# Patient Record
Sex: MALE | Race: WHITE | NOT HISPANIC OR LATINO | Employment: FULL TIME | URBAN - METROPOLITAN AREA
[De-identification: names, ages, dates, MRNs, and addresses within clinical notes are randomized per-mention and may not be internally consistent; named-entity substitution may affect disease eponyms.]

---

## 2021-01-19 ENCOUNTER — TELEPHONE (OUTPATIENT)
Dept: UROLOGY | Facility: CLINIC | Age: 56
End: 2021-01-19

## 2021-01-19 ENCOUNTER — LAB VISIT (OUTPATIENT)
Dept: LAB | Facility: HOSPITAL | Age: 56
End: 2021-01-19
Attending: UROLOGY
Payer: COMMERCIAL

## 2021-01-19 DIAGNOSIS — E29.1 HYPOGONADISM IN MALE: Primary | ICD-10-CM

## 2021-01-19 DIAGNOSIS — E29.1 HYPOGONADISM IN MALE: ICD-10-CM

## 2021-01-19 PROCEDURE — 36415 COLL VENOUS BLD VENIPUNCTURE: CPT | Mod: PO

## 2021-01-19 PROCEDURE — 84153 ASSAY OF PSA TOTAL: CPT

## 2021-01-19 PROCEDURE — 84403 ASSAY OF TOTAL TESTOSTERONE: CPT

## 2021-01-20 LAB
COMPLEXED PSA SERPL-MCNC: 0.5 NG/ML (ref 0–4)
TESTOST SERPL-MCNC: 236 NG/DL (ref 304–1227)

## 2021-01-29 ENCOUNTER — OFFICE VISIT (OUTPATIENT)
Dept: UROLOGY | Facility: CLINIC | Age: 56
End: 2021-01-29
Payer: COMMERCIAL

## 2021-01-29 ENCOUNTER — LAB VISIT (OUTPATIENT)
Dept: LAB | Facility: HOSPITAL | Age: 56
End: 2021-01-29
Attending: UROLOGY
Payer: COMMERCIAL

## 2021-01-29 VITALS — DIASTOLIC BLOOD PRESSURE: 80 MMHG | TEMPERATURE: 98 F | WEIGHT: 254.63 LBS | SYSTOLIC BLOOD PRESSURE: 156 MMHG

## 2021-01-29 DIAGNOSIS — N13.8 BPH WITH OBSTRUCTION/LOWER URINARY TRACT SYMPTOMS: ICD-10-CM

## 2021-01-29 DIAGNOSIS — R39.15 URINARY URGENCY: ICD-10-CM

## 2021-01-29 DIAGNOSIS — N40.1 BPH WITH OBSTRUCTION/LOWER URINARY TRACT SYMPTOMS: ICD-10-CM

## 2021-01-29 DIAGNOSIS — E29.1 HYPOGONADISM IN MALE: Primary | ICD-10-CM

## 2021-01-29 DIAGNOSIS — E29.1 HYPOGONADISM IN MALE: ICD-10-CM

## 2021-01-29 PROCEDURE — 36415 COLL VENOUS BLD VENIPUNCTURE: CPT | Mod: PO

## 2021-01-29 PROCEDURE — 99203 PR OFFICE/OUTPT VISIT, NEW, LEVL III, 30-44 MIN: ICD-10-PCS | Mod: S$GLB,,, | Performed by: UROLOGY

## 2021-01-29 PROCEDURE — 1126F AMNT PAIN NOTED NONE PRSNT: CPT | Mod: S$GLB,,, | Performed by: UROLOGY

## 2021-01-29 PROCEDURE — 99999 PR PBB SHADOW E&M-EST. PATIENT-LVL III: ICD-10-PCS | Mod: PBBFAC,,, | Performed by: UROLOGY

## 2021-01-29 PROCEDURE — 99203 OFFICE O/P NEW LOW 30 MIN: CPT | Mod: S$GLB,,, | Performed by: UROLOGY

## 2021-01-29 PROCEDURE — 99999 PR PBB SHADOW E&M-EST. PATIENT-LVL III: CPT | Mod: PBBFAC,,, | Performed by: UROLOGY

## 2021-01-29 PROCEDURE — 1126F PR PAIN SEVERITY QUANTIFIED, NO PAIN PRESENT: ICD-10-PCS | Mod: S$GLB,,, | Performed by: UROLOGY

## 2021-01-29 PROCEDURE — 84403 ASSAY OF TOTAL TESTOSTERONE: CPT

## 2021-01-29 RX ORDER — TADALAFIL 5 MG/1
TABLET ORAL
COMMUNITY
End: 2021-08-05 | Stop reason: SDUPTHER

## 2021-01-29 RX ORDER — TESTOSTERONE CYPIONATE 200 MG/ML
400 INJECTION, SOLUTION INTRAMUSCULAR
Qty: 4 ML | Refills: 0 | Status: SHIPPED | OUTPATIENT
Start: 2021-01-29 | End: 2021-03-02 | Stop reason: SDUPTHER

## 2021-01-29 RX ORDER — TESTOSTERONE CYPIONATE 200 MG/ML
200 INJECTION, SOLUTION INTRAMUSCULAR
Qty: 2 ML | Refills: 0 | Status: SHIPPED | OUTPATIENT
Start: 2021-01-29 | End: 2021-01-29 | Stop reason: SDUPTHER

## 2021-01-29 RX ORDER — PANTOPRAZOLE SODIUM 40 MG/1
40 TABLET, DELAYED RELEASE ORAL
COMMUNITY
Start: 2020-12-07 | End: 2022-11-04

## 2021-01-29 RX ORDER — TAMSULOSIN HYDROCHLORIDE 0.4 MG/1
1 CAPSULE ORAL NIGHTLY
COMMUNITY
Start: 2021-01-09 | End: 2021-08-05 | Stop reason: SDUPTHER

## 2021-01-29 RX ORDER — TESTOSTERONE CYPIONATE 200 MG/ML
200 INJECTION, SOLUTION INTRAMUSCULAR
Status: DISCONTINUED | OUTPATIENT
Start: 2021-01-29 | End: 2021-01-29

## 2021-01-29 RX ORDER — LISINOPRIL 10 MG/1
TABLET ORAL
COMMUNITY

## 2021-01-29 RX ORDER — CITALOPRAM 20 MG/1
20 TABLET, FILM COATED ORAL DAILY
COMMUNITY
Start: 2021-01-22

## 2021-01-30 LAB — TESTOST SERPL-MCNC: 238 NG/DL (ref 304–1227)

## 2021-03-02 ENCOUNTER — TELEPHONE (OUTPATIENT)
Dept: UROLOGY | Facility: CLINIC | Age: 56
End: 2021-03-02

## 2021-03-02 RX ORDER — TESTOSTERONE CYPIONATE 200 MG/ML
400 INJECTION, SOLUTION INTRAMUSCULAR
Qty: 4 ML | Refills: 0 | Status: SHIPPED | OUTPATIENT
Start: 2021-03-02 | End: 2021-04-07 | Stop reason: SDUPTHER

## 2021-03-05 ENCOUNTER — TELEPHONE (OUTPATIENT)
Dept: UROLOGY | Facility: CLINIC | Age: 56
End: 2021-03-05

## 2021-03-17 ENCOUNTER — DOCUMENTATION ONLY (OUTPATIENT)
Dept: UROLOGY | Facility: CLINIC | Age: 56
End: 2021-03-17

## 2021-03-23 DIAGNOSIS — E29.1 HYPOGONADISM IN MALE: Primary | ICD-10-CM

## 2021-03-24 ENCOUNTER — TELEPHONE (OUTPATIENT)
Dept: UROLOGY | Facility: CLINIC | Age: 56
End: 2021-03-24

## 2021-03-25 ENCOUNTER — TELEPHONE (OUTPATIENT)
Dept: UROLOGY | Facility: CLINIC | Age: 56
End: 2021-03-25

## 2021-04-07 ENCOUNTER — TELEPHONE (OUTPATIENT)
Dept: UROLOGY | Facility: CLINIC | Age: 56
End: 2021-04-07

## 2021-04-07 RX ORDER — TESTOSTERONE CYPIONATE 200 MG/ML
400 INJECTION, SOLUTION INTRAMUSCULAR
Qty: 4 ML | Refills: 2 | Status: SHIPPED | OUTPATIENT
Start: 2021-04-07 | End: 2021-08-05 | Stop reason: SDUPTHER

## 2021-04-26 ENCOUNTER — TELEPHONE (OUTPATIENT)
Dept: UROLOGY | Facility: CLINIC | Age: 56
End: 2021-04-26

## 2021-07-09 ENCOUNTER — TELEPHONE (OUTPATIENT)
Dept: UROLOGY | Facility: CLINIC | Age: 56
End: 2021-07-09

## 2021-07-20 DIAGNOSIS — E29.1 HYPOGONADISM IN MALE: Primary | ICD-10-CM

## 2021-07-26 ENCOUNTER — LAB VISIT (OUTPATIENT)
Dept: LAB | Facility: HOSPITAL | Age: 56
End: 2021-07-26
Attending: UROLOGY
Payer: COMMERCIAL

## 2021-07-26 DIAGNOSIS — E29.1 HYPOGONADISM IN MALE: ICD-10-CM

## 2021-07-26 LAB
COMPLEXED PSA SERPL-MCNC: 0.67 NG/ML (ref 0–4)
TESTOST SERPL-MCNC: 1338 NG/DL (ref 304–1227)

## 2021-07-26 PROCEDURE — 84403 ASSAY OF TOTAL TESTOSTERONE: CPT | Performed by: UROLOGY

## 2021-07-26 PROCEDURE — 36415 COLL VENOUS BLD VENIPUNCTURE: CPT | Mod: PO | Performed by: UROLOGY

## 2021-07-26 PROCEDURE — 84153 ASSAY OF PSA TOTAL: CPT | Performed by: UROLOGY

## 2021-08-05 ENCOUNTER — OFFICE VISIT (OUTPATIENT)
Dept: UROLOGY | Facility: CLINIC | Age: 56
End: 2021-08-05
Payer: COMMERCIAL

## 2021-08-05 VITALS
DIASTOLIC BLOOD PRESSURE: 86 MMHG | WEIGHT: 254 LBS | SYSTOLIC BLOOD PRESSURE: 138 MMHG | HEIGHT: 67 IN | BODY MASS INDEX: 39.87 KG/M2

## 2021-08-05 DIAGNOSIS — G47.33 OSA (OBSTRUCTIVE SLEEP APNEA): ICD-10-CM

## 2021-08-05 DIAGNOSIS — E29.1 HYPOGONADISM IN MALE: Primary | ICD-10-CM

## 2021-08-05 DIAGNOSIS — N40.1 BPH WITH OBSTRUCTION/LOWER URINARY TRACT SYMPTOMS: ICD-10-CM

## 2021-08-05 DIAGNOSIS — N13.8 BPH WITH OBSTRUCTION/LOWER URINARY TRACT SYMPTOMS: ICD-10-CM

## 2021-08-05 DIAGNOSIS — N39.41 URGE INCONTINENCE: ICD-10-CM

## 2021-08-05 PROCEDURE — 3079F PR MOST RECENT DIASTOLIC BLOOD PRESSURE 80-89 MM HG: ICD-10-PCS | Mod: CPTII,S$GLB,, | Performed by: UROLOGY

## 2021-08-05 PROCEDURE — 99999 PR PBB SHADOW E&M-EST. PATIENT-LVL III: CPT | Mod: PBBFAC,,, | Performed by: UROLOGY

## 2021-08-05 PROCEDURE — 3079F DIAST BP 80-89 MM HG: CPT | Mod: CPTII,S$GLB,, | Performed by: UROLOGY

## 2021-08-05 PROCEDURE — 1159F MED LIST DOCD IN RCRD: CPT | Mod: CPTII,S$GLB,, | Performed by: UROLOGY

## 2021-08-05 PROCEDURE — 99214 OFFICE O/P EST MOD 30 MIN: CPT | Mod: S$GLB,,, | Performed by: UROLOGY

## 2021-08-05 PROCEDURE — 3075F SYST BP GE 130 - 139MM HG: CPT | Mod: CPTII,S$GLB,, | Performed by: UROLOGY

## 2021-08-05 PROCEDURE — 99214 PR OFFICE/OUTPT VISIT, EST, LEVL IV, 30-39 MIN: ICD-10-PCS | Mod: S$GLB,,, | Performed by: UROLOGY

## 2021-08-05 PROCEDURE — 1160F PR REVIEW ALL MEDS BY PRESCRIBER/CLIN PHARMACIST DOCUMENTED: ICD-10-PCS | Mod: CPTII,S$GLB,, | Performed by: UROLOGY

## 2021-08-05 PROCEDURE — 3075F PR MOST RECENT SYSTOLIC BLOOD PRESS GE 130-139MM HG: ICD-10-PCS | Mod: CPTII,S$GLB,, | Performed by: UROLOGY

## 2021-08-05 PROCEDURE — 1160F RVW MEDS BY RX/DR IN RCRD: CPT | Mod: CPTII,S$GLB,, | Performed by: UROLOGY

## 2021-08-05 PROCEDURE — 99999 PR PBB SHADOW E&M-EST. PATIENT-LVL III: ICD-10-PCS | Mod: PBBFAC,,, | Performed by: UROLOGY

## 2021-08-05 PROCEDURE — 1126F PR PAIN SEVERITY QUANTIFIED, NO PAIN PRESENT: ICD-10-PCS | Mod: CPTII,S$GLB,, | Performed by: UROLOGY

## 2021-08-05 PROCEDURE — 3008F PR BODY MASS INDEX (BMI) DOCUMENTED: ICD-10-PCS | Mod: CPTII,S$GLB,, | Performed by: UROLOGY

## 2021-08-05 PROCEDURE — 1126F AMNT PAIN NOTED NONE PRSNT: CPT | Mod: CPTII,S$GLB,, | Performed by: UROLOGY

## 2021-08-05 PROCEDURE — 1159F PR MEDICATION LIST DOCUMENTED IN MEDICAL RECORD: ICD-10-PCS | Mod: CPTII,S$GLB,, | Performed by: UROLOGY

## 2021-08-05 PROCEDURE — 3008F BODY MASS INDEX DOCD: CPT | Mod: CPTII,S$GLB,, | Performed by: UROLOGY

## 2021-08-05 RX ORDER — TADALAFIL 5 MG/1
5 TABLET ORAL DAILY
Qty: 30 TABLET | Refills: 11 | Status: SHIPPED | OUTPATIENT
Start: 2021-08-05 | End: 2022-05-03 | Stop reason: SDUPTHER

## 2021-08-05 RX ORDER — TROSPIUM CHLORIDE 20 MG/1
20 TABLET, FILM COATED ORAL 2 TIMES DAILY
Qty: 60 TABLET | Refills: 11 | Status: SHIPPED | OUTPATIENT
Start: 2021-08-05 | End: 2022-05-03 | Stop reason: SDUPTHER

## 2021-08-05 RX ORDER — TAMSULOSIN HYDROCHLORIDE 0.4 MG/1
1 CAPSULE ORAL NIGHTLY
Qty: 30 CAPSULE | Refills: 11 | Status: SHIPPED | OUTPATIENT
Start: 2021-08-05 | End: 2022-05-03 | Stop reason: SDUPTHER

## 2021-08-05 RX ORDER — TESTOSTERONE CYPIONATE 200 MG/ML
200 INJECTION, SOLUTION INTRAMUSCULAR
Qty: 4 ML | Refills: 5 | Status: SHIPPED | OUTPATIENT
Start: 2021-08-05 | End: 2022-02-02 | Stop reason: SDUPTHER

## 2021-09-16 ENCOUNTER — TELEPHONE (OUTPATIENT)
Dept: UROLOGY | Facility: CLINIC | Age: 56
End: 2021-09-16

## 2021-11-05 ENCOUNTER — OFFICE VISIT (OUTPATIENT)
Dept: UROLOGY | Facility: CLINIC | Age: 56
End: 2021-11-05
Payer: COMMERCIAL

## 2021-11-05 VITALS — WEIGHT: 257.25 LBS | BODY MASS INDEX: 40.3 KG/M2

## 2021-11-05 DIAGNOSIS — N32.81 OAB (OVERACTIVE BLADDER): ICD-10-CM

## 2021-11-05 DIAGNOSIS — G47.33 OSA (OBSTRUCTIVE SLEEP APNEA): ICD-10-CM

## 2021-11-05 DIAGNOSIS — E29.1 HYPOGONADISM IN MALE: Primary | ICD-10-CM

## 2021-11-05 PROCEDURE — 99214 OFFICE O/P EST MOD 30 MIN: CPT | Mod: S$GLB,,, | Performed by: UROLOGY

## 2021-11-05 PROCEDURE — 1159F MED LIST DOCD IN RCRD: CPT | Mod: CPTII,S$GLB,, | Performed by: UROLOGY

## 2021-11-05 PROCEDURE — 99214 PR OFFICE/OUTPT VISIT, EST, LEVL IV, 30-39 MIN: ICD-10-PCS | Mod: S$GLB,,, | Performed by: UROLOGY

## 2021-11-05 PROCEDURE — 99999 PR PBB SHADOW E&M-EST. PATIENT-LVL III: ICD-10-PCS | Mod: PBBFAC,,, | Performed by: UROLOGY

## 2021-11-05 PROCEDURE — 4010F PR ACE/ARB THEARPY RXD/TAKEN: ICD-10-PCS | Mod: CPTII,S$GLB,, | Performed by: UROLOGY

## 2021-11-05 PROCEDURE — 4010F ACE/ARB THERAPY RXD/TAKEN: CPT | Mod: CPTII,S$GLB,, | Performed by: UROLOGY

## 2021-11-05 PROCEDURE — 3008F BODY MASS INDEX DOCD: CPT | Mod: CPTII,S$GLB,, | Performed by: UROLOGY

## 2021-11-05 PROCEDURE — 99999 PR PBB SHADOW E&M-EST. PATIENT-LVL III: CPT | Mod: PBBFAC,,, | Performed by: UROLOGY

## 2021-11-05 PROCEDURE — 1159F PR MEDICATION LIST DOCUMENTED IN MEDICAL RECORD: ICD-10-PCS | Mod: CPTII,S$GLB,, | Performed by: UROLOGY

## 2021-11-05 PROCEDURE — 3008F PR BODY MASS INDEX (BMI) DOCUMENTED: ICD-10-PCS | Mod: CPTII,S$GLB,, | Performed by: UROLOGY

## 2021-11-05 RX ORDER — DIAZEPAM 5 MG/1
TABLET ORAL
Qty: 2 TABLET | Refills: 0 | Status: SHIPPED | OUTPATIENT
Start: 2021-11-05 | End: 2023-05-05

## 2021-11-09 ENCOUNTER — LAB VISIT (OUTPATIENT)
Dept: LAB | Facility: HOSPITAL | Age: 56
End: 2021-11-09
Attending: UROLOGY
Payer: COMMERCIAL

## 2021-11-09 DIAGNOSIS — E29.1 HYPOGONADISM IN MALE: ICD-10-CM

## 2021-11-09 LAB — TESTOST SERPL-MCNC: 108 NG/DL (ref 304–1227)

## 2021-11-09 PROCEDURE — 36415 COLL VENOUS BLD VENIPUNCTURE: CPT | Mod: PO | Performed by: UROLOGY

## 2021-11-09 PROCEDURE — 84403 ASSAY OF TOTAL TESTOSTERONE: CPT | Performed by: UROLOGY

## 2021-11-18 ENCOUNTER — TELEPHONE (OUTPATIENT)
Dept: UROLOGY | Facility: CLINIC | Age: 56
End: 2021-11-18
Payer: COMMERCIAL

## 2021-11-19 ENCOUNTER — PROCEDURE VISIT (OUTPATIENT)
Dept: UROLOGY | Facility: CLINIC | Age: 56
End: 2021-11-19
Payer: COMMERCIAL

## 2021-11-19 VITALS
SYSTOLIC BLOOD PRESSURE: 120 MMHG | HEIGHT: 67 IN | BODY MASS INDEX: 39.93 KG/M2 | WEIGHT: 254.44 LBS | DIASTOLIC BLOOD PRESSURE: 90 MMHG

## 2021-11-19 DIAGNOSIS — R39.15 URINARY URGENCY: Primary | ICD-10-CM

## 2021-11-19 DIAGNOSIS — N40.1 BPH WITH OBSTRUCTION/LOWER URINARY TRACT SYMPTOMS: ICD-10-CM

## 2021-11-19 DIAGNOSIS — N13.8 BPH WITH OBSTRUCTION/LOWER URINARY TRACT SYMPTOMS: ICD-10-CM

## 2021-11-19 LAB
BILIRUB SERPL-MCNC: NORMAL MG/DL
BLOOD URINE, POC: NORMAL
CLARITY, POC UA: CLEAR
COLOR, POC UA: YELLOW
GLUCOSE UR QL STRIP: 30
KETONES UR QL STRIP: NORMAL
LEUKOCYTE ESTERASE URINE, POC: NORMAL
NITRITE, POC UA: NORMAL
PH, POC UA: 7
PROTEIN, POC: NORMAL
SPECIFIC GRAVITY, POC UA: 1.01
UROBILINOGEN, POC UA: NORMAL

## 2021-11-19 PROCEDURE — 52000 CYSTOURETHROSCOPY: CPT | Mod: S$GLB,,, | Performed by: UROLOGY

## 2021-11-19 PROCEDURE — 81002 URINALYSIS NONAUTO W/O SCOPE: CPT | Mod: S$GLB,,, | Performed by: UROLOGY

## 2021-11-19 PROCEDURE — 52000 PR CYSTOURETHROSCOPY: ICD-10-PCS | Mod: S$GLB,,, | Performed by: UROLOGY

## 2021-11-19 PROCEDURE — 81002 POCT URINE DIPSTICK WITHOUT MICROSCOPE: ICD-10-PCS | Mod: S$GLB,,, | Performed by: UROLOGY

## 2021-11-19 RX ORDER — CIPROFLOXACIN 500 MG/1
500 TABLET ORAL
Status: COMPLETED | OUTPATIENT
Start: 2021-11-19 | End: 2021-11-19

## 2021-11-19 RX ORDER — LIDOCAINE HYDROCHLORIDE 20 MG/ML
JELLY TOPICAL
Status: COMPLETED | OUTPATIENT
Start: 2021-11-19 | End: 2021-11-19

## 2021-11-19 RX ORDER — LIDOCAINE HYDROCHLORIDE 20 MG/ML
JELLY TOPICAL
Status: DISCONTINUED | OUTPATIENT
Start: 2021-11-19 | End: 2021-11-19

## 2021-11-19 RX ADMIN — LIDOCAINE HYDROCHLORIDE 10 ML: 20 JELLY TOPICAL at 03:11

## 2021-11-19 RX ADMIN — CIPROFLOXACIN 500 MG: 500 TABLET ORAL at 03:11

## 2021-11-22 ENCOUNTER — HOSPITAL ENCOUNTER (OUTPATIENT)
Dept: RADIOLOGY | Facility: HOSPITAL | Age: 56
Discharge: HOME | End: 2021-11-22
Attending: UROLOGY
Payer: COMMERCIAL

## 2021-11-22 DIAGNOSIS — N13.8 BPH WITH OBSTRUCTION/LOWER URINARY TRACT SYMPTOMS: ICD-10-CM

## 2021-11-22 DIAGNOSIS — N40.1 BPH WITH OBSTRUCTION/LOWER URINARY TRACT SYMPTOMS: ICD-10-CM

## 2021-11-22 PROCEDURE — 76857 US EXAM PELVIC LIMITED: CPT | Mod: 26,,, | Performed by: RADIOLOGY

## 2021-11-22 PROCEDURE — 76857 US EXAM PELVIC LIMITED: CPT | Mod: TC

## 2021-11-22 PROCEDURE — 76857 US PELVIS LIMITED NON OB: ICD-10-PCS | Mod: 26,,, | Performed by: RADIOLOGY

## 2021-11-23 ENCOUNTER — PROCEDURE VISIT (OUTPATIENT)
Dept: UROLOGY | Facility: CLINIC | Age: 56
End: 2021-11-23
Payer: COMMERCIAL

## 2021-11-23 DIAGNOSIS — E29.1 HYPOGONADISM IN MALE: Primary | ICD-10-CM

## 2021-11-23 DIAGNOSIS — N13.8 BPH WITH OBSTRUCTION/LOWER URINARY TRACT SYMPTOMS: ICD-10-CM

## 2021-11-23 DIAGNOSIS — N40.1 BPH WITH OBSTRUCTION/LOWER URINARY TRACT SYMPTOMS: ICD-10-CM

## 2021-11-23 PROCEDURE — 99213 PR OFFICE/OUTPT VISIT, EST, LEVL III, 20-29 MIN: ICD-10-PCS | Mod: 25,S$GLB,, | Performed by: UROLOGY

## 2021-11-23 PROCEDURE — S0189 PR TESTOSTERONE PELLET 75 MG: ICD-10-PCS | Mod: S$GLB,,, | Performed by: UROLOGY

## 2021-11-23 PROCEDURE — S0189 TESTOSTERONE PELLET 75 MG: HCPCS | Mod: S$GLB,,, | Performed by: UROLOGY

## 2021-11-23 PROCEDURE — 11980 PR IMPLANT,HORMONE,SUBCUTANEOUS: ICD-10-PCS | Mod: S$GLB,,, | Performed by: UROLOGY

## 2021-11-23 PROCEDURE — 99213 OFFICE O/P EST LOW 20 MIN: CPT | Mod: 25,S$GLB,, | Performed by: UROLOGY

## 2021-11-23 PROCEDURE — 11980 IMPLANT HORMONE PELLET(S): CPT | Mod: S$GLB,,, | Performed by: UROLOGY

## 2021-11-24 DIAGNOSIS — Z01.818 PREOP TESTING: Primary | ICD-10-CM

## 2021-11-29 ENCOUNTER — HOSPITAL ENCOUNTER (OUTPATIENT)
Dept: RADIOLOGY | Facility: HOSPITAL | Age: 56
Discharge: HOME | End: 2021-11-29
Attending: UROLOGY
Payer: COMMERCIAL

## 2021-11-29 ENCOUNTER — HOSPITAL ENCOUNTER (OUTPATIENT)
Dept: CARDIOLOGY | Facility: HOSPITAL | Age: 56
Discharge: HOME | End: 2021-11-29
Attending: UROLOGY
Payer: COMMERCIAL

## 2021-11-29 DIAGNOSIS — Z01.818 PREOP TESTING: ICD-10-CM

## 2021-11-29 PROCEDURE — 93010 ELECTROCARDIOGRAM REPORT: CPT | Mod: ,,, | Performed by: INTERNAL MEDICINE

## 2021-11-29 PROCEDURE — 71046 X-RAY EXAM CHEST 2 VIEWS: CPT | Mod: TC,FY,PO

## 2021-11-29 PROCEDURE — 93005 ELECTROCARDIOGRAM TRACING: CPT | Mod: PO

## 2021-11-29 PROCEDURE — 93010 EKG 12-LEAD: ICD-10-PCS | Mod: ,,, | Performed by: INTERNAL MEDICINE

## 2021-11-29 PROCEDURE — 71046 XR CHEST PA AND LATERAL: ICD-10-PCS | Mod: 26,,, | Performed by: RADIOLOGY

## 2021-11-29 PROCEDURE — 71046 X-RAY EXAM CHEST 2 VIEWS: CPT | Mod: 26,,, | Performed by: RADIOLOGY

## 2021-12-15 ENCOUNTER — LAB VISIT (OUTPATIENT)
Dept: LAB | Facility: HOSPITAL | Age: 56
End: 2021-12-15
Attending: UROLOGY
Payer: COMMERCIAL

## 2021-12-15 DIAGNOSIS — E29.1 HYPOGONADISM IN MALE: ICD-10-CM

## 2021-12-15 LAB — TESTOST SERPL-MCNC: 321 NG/DL (ref 304–1227)

## 2021-12-15 PROCEDURE — 84403 ASSAY OF TOTAL TESTOSTERONE: CPT | Performed by: UROLOGY

## 2021-12-15 PROCEDURE — 36415 COLL VENOUS BLD VENIPUNCTURE: CPT | Mod: PO | Performed by: UROLOGY

## 2021-12-16 ENCOUNTER — TELEPHONE (OUTPATIENT)
Dept: UROLOGY | Facility: CLINIC | Age: 56
End: 2021-12-16
Payer: COMMERCIAL

## 2021-12-16 DIAGNOSIS — E29.1 HYPOGONADISM IN MALE: Primary | ICD-10-CM

## 2022-01-24 ENCOUNTER — LAB VISIT (OUTPATIENT)
Dept: LAB | Facility: HOSPITAL | Age: 57
End: 2022-01-24
Attending: UROLOGY
Payer: COMMERCIAL

## 2022-01-24 DIAGNOSIS — E29.1 HYPOGONADISM IN MALE: ICD-10-CM

## 2022-01-24 PROCEDURE — 36415 COLL VENOUS BLD VENIPUNCTURE: CPT | Mod: PO | Performed by: UROLOGY

## 2022-01-24 PROCEDURE — 84403 ASSAY OF TOTAL TESTOSTERONE: CPT | Performed by: UROLOGY

## 2022-01-25 LAB — TESTOST SERPL-MCNC: 283 NG/DL (ref 304–1227)

## 2022-01-27 ENCOUNTER — TELEPHONE (OUTPATIENT)
Dept: UROLOGY | Facility: CLINIC | Age: 57
End: 2022-01-27
Payer: COMMERCIAL

## 2022-01-27 DIAGNOSIS — E29.1 HYPOGONADISM IN MALE: Primary | ICD-10-CM

## 2022-01-27 RX ORDER — TESTOSTERONE ENANTHATE 75 MG/.5ML
75 INJECTION SUBCUTANEOUS
Qty: 4 EACH | Refills: 3 | Status: SHIPPED | OUTPATIENT
Start: 2022-01-27 | End: 2022-05-03 | Stop reason: SDUPTHER

## 2022-01-27 NOTE — TELEPHONE ENCOUNTER
Called pt and discussed low testosterone level 2 months following testopel. Discussed alternative therapies and he would like to try xyosted. Will arrange f/u and labs in 3 months.

## 2022-02-01 ENCOUNTER — TELEPHONE (OUTPATIENT)
Dept: UROLOGY | Facility: CLINIC | Age: 57
End: 2022-02-01
Payer: COMMERCIAL

## 2022-02-01 NOTE — TELEPHONE ENCOUNTER
Called pt to verify prescription provider, submitted pt prior auth, once completed informed pt that it may take 48-72 hours for approval, pt states if not approved by tomorrow, is it possible Dr Drew can send him out a script for the testosterone cypionate until the xyosted gets approved. Informed pt that I will check the status again tomorrow and see what Dr Drew would like to do. Pt verbalized understanding. [facility]#[id number]

## 2022-02-02 RX ORDER — TESTOSTERONE CYPIONATE 200 MG/ML
200 INJECTION, SOLUTION INTRAMUSCULAR
Qty: 4 ML | Refills: 0 | Status: SHIPPED | OUTPATIENT
Start: 2022-02-02 | End: 2022-08-31 | Stop reason: SDUPTHER

## 2022-02-02 NOTE — TELEPHONE ENCOUNTER
Called and spoke with pt, informed him that Dr Drew has sent out script of testosterone cypionate to his pharmacy for him to take until we get his approval for his xyosted. Pt informed once he starts to take the Xyosted his to no longer take the Testosteron Cypionate injection, Pt verbalized understanding.

## 2022-03-03 ENCOUNTER — CLINICAL SUPPORT (OUTPATIENT)
Dept: URGENT CARE | Facility: CLINIC | Age: 57
End: 2022-03-03
Payer: COMMERCIAL

## 2022-03-03 DIAGNOSIS — Z11.52 ENCOUNTER FOR SCREENING FOR COVID-19: Primary | ICD-10-CM

## 2022-03-03 LAB
CTP QC/QA: YES
SARS-COV-2 RDRP RESP QL NAA+PROBE: NEGATIVE

## 2022-03-03 PROCEDURE — 99211 PR OFFICE/OUTPT VISIT, EST, LEVL I: ICD-10-PCS | Mod: S$GLB,,, | Performed by: STUDENT IN AN ORGANIZED HEALTH CARE EDUCATION/TRAINING PROGRAM

## 2022-03-03 PROCEDURE — 99211 OFF/OP EST MAY X REQ PHY/QHP: CPT | Mod: S$GLB,,, | Performed by: STUDENT IN AN ORGANIZED HEALTH CARE EDUCATION/TRAINING PROGRAM

## 2022-03-03 PROCEDURE — U0002: ICD-10-PCS | Mod: QW,S$GLB,, | Performed by: STUDENT IN AN ORGANIZED HEALTH CARE EDUCATION/TRAINING PROGRAM

## 2022-03-03 PROCEDURE — U0002 COVID-19 LAB TEST NON-CDC: HCPCS | Mod: QW,S$GLB,, | Performed by: STUDENT IN AN ORGANIZED HEALTH CARE EDUCATION/TRAINING PROGRAM

## 2022-03-03 NOTE — PATIENT INSTRUCTIONS
Your test was NEGATIVE for COVID-19 (coronavirus).      You may leave home and/or return to work when the following conditions are met:  24 hours fever free without fever-reducing medications AND  Improved symptoms  You are fully vaccinated or have not had close contact with someone with COVID-19 (within 6 feet for 15 minutes or more)    If you are fully vaccinated and had a close contact, there is no need for quarantine, unless you develop symptoms.      If you are not fully vaccinated and had a close contact:  Retest at 5 to 7 days post-exposure  If possible, it is recommended that you quarantine for 5 days from the time of contact regardless of your test status.  A mask should be worn indoors post quarantine.    If your symptoms worsen or if you have any other concerns, please contact [facility] at [phone].     Sincerely,    Joann Dumont MA

## 2022-04-26 ENCOUNTER — LAB VISIT (OUTPATIENT)
Dept: LAB | Facility: HOSPITAL | Age: 57
End: 2022-04-26
Attending: UROLOGY
Payer: COMMERCIAL

## 2022-04-26 DIAGNOSIS — E29.1 HYPOGONADISM IN MALE: ICD-10-CM

## 2022-04-26 PROCEDURE — 84153 ASSAY OF PSA TOTAL: CPT | Performed by: UROLOGY

## 2022-04-26 PROCEDURE — 80053 COMPREHEN METABOLIC PANEL: CPT | Performed by: UROLOGY

## 2022-04-26 PROCEDURE — 36415 COLL VENOUS BLD VENIPUNCTURE: CPT | Mod: PO | Performed by: UROLOGY

## 2022-04-26 PROCEDURE — 85027 COMPLETE CBC AUTOMATED: CPT | Performed by: UROLOGY

## 2022-04-26 PROCEDURE — 84403 ASSAY OF TOTAL TESTOSTERONE: CPT | Performed by: UROLOGY

## 2022-04-27 LAB
ALBUMIN SERPL BCP-MCNC: 3.8 G/DL (ref 3.5–5.2)
ALP SERPL-CCNC: 43 U/L (ref 55–135)
ALT SERPL W/O P-5'-P-CCNC: 18 U/L (ref 10–44)
ANION GAP SERPL CALC-SCNC: 10 MMOL/L (ref 8–16)
AST SERPL-CCNC: 16 U/L (ref 10–40)
BILIRUB SERPL-MCNC: 0.3 MG/DL (ref 0.1–1)
BUN SERPL-MCNC: 13 MG/DL (ref 6–20)
CALCIUM SERPL-MCNC: 9.5 MG/DL (ref 8.7–10.5)
CHLORIDE SERPL-SCNC: 99 MMOL/L (ref 95–110)
CO2 SERPL-SCNC: 29 MMOL/L (ref 23–29)
COMPLEXED PSA SERPL-MCNC: 0.72 NG/ML (ref 0–4)
CREAT SERPL-MCNC: 0.9 MG/DL (ref 0.5–1.4)
ERYTHROCYTE [DISTWIDTH] IN BLOOD BY AUTOMATED COUNT: 20.6 % (ref 11.5–14.5)
EST. GFR  (AFRICAN AMERICAN): >60 ML/MIN/1.73 M^2
EST. GFR  (NON AFRICAN AMERICAN): >60 ML/MIN/1.73 M^2
GLUCOSE SERPL-MCNC: 82 MG/DL (ref 70–110)
HCT VFR BLD AUTO: 49.2 % (ref 40–54)
HGB BLD-MCNC: 14.4 G/DL (ref 14–18)
MCH RBC QN AUTO: 22.5 PG (ref 27–31)
MCHC RBC AUTO-ENTMCNC: 29.3 G/DL (ref 32–36)
MCV RBC AUTO: 77 FL (ref 82–98)
PLATELET # BLD AUTO: 169 K/UL (ref 150–450)
PMV BLD AUTO: 10.8 FL (ref 9.2–12.9)
POTASSIUM SERPL-SCNC: 4.4 MMOL/L (ref 3.5–5.1)
PROT SERPL-MCNC: 7.3 G/DL (ref 6–8.4)
RBC # BLD AUTO: 6.4 M/UL (ref 4.6–6.2)
SODIUM SERPL-SCNC: 138 MMOL/L (ref 136–145)
TESTOST SERPL-MCNC: 609 NG/DL (ref 304–1227)
WBC # BLD AUTO: 7.84 K/UL (ref 3.9–12.7)

## 2022-05-03 ENCOUNTER — OFFICE VISIT (OUTPATIENT)
Dept: UROLOGY | Facility: CLINIC | Age: 57
End: 2022-05-03
Payer: COMMERCIAL

## 2022-05-03 VITALS
DIASTOLIC BLOOD PRESSURE: 80 MMHG | BODY MASS INDEX: 39.57 KG/M2 | WEIGHT: 252.63 LBS | SYSTOLIC BLOOD PRESSURE: 122 MMHG

## 2022-05-03 DIAGNOSIS — N40.1 BPH WITH OBSTRUCTION/LOWER URINARY TRACT SYMPTOMS: ICD-10-CM

## 2022-05-03 DIAGNOSIS — N32.81 OAB (OVERACTIVE BLADDER): ICD-10-CM

## 2022-05-03 DIAGNOSIS — E29.1 HYPOGONADISM IN MALE: Primary | ICD-10-CM

## 2022-05-03 DIAGNOSIS — N13.8 BPH WITH OBSTRUCTION/LOWER URINARY TRACT SYMPTOMS: ICD-10-CM

## 2022-05-03 PROCEDURE — 3008F BODY MASS INDEX DOCD: CPT | Mod: CPTII,S$GLB,, | Performed by: UROLOGY

## 2022-05-03 PROCEDURE — 3079F DIAST BP 80-89 MM HG: CPT | Mod: CPTII,S$GLB,, | Performed by: UROLOGY

## 2022-05-03 PROCEDURE — 3008F PR BODY MASS INDEX (BMI) DOCUMENTED: ICD-10-PCS | Mod: CPTII,S$GLB,, | Performed by: UROLOGY

## 2022-05-03 PROCEDURE — 4010F ACE/ARB THERAPY RXD/TAKEN: CPT | Mod: CPTII,S$GLB,, | Performed by: UROLOGY

## 2022-05-03 PROCEDURE — 4010F PR ACE/ARB THEARPY RXD/TAKEN: ICD-10-PCS | Mod: CPTII,S$GLB,, | Performed by: UROLOGY

## 2022-05-03 PROCEDURE — 99999 PR PBB SHADOW E&M-EST. PATIENT-LVL III: ICD-10-PCS | Mod: PBBFAC,,, | Performed by: UROLOGY

## 2022-05-03 PROCEDURE — 99214 OFFICE O/P EST MOD 30 MIN: CPT | Mod: S$GLB,,, | Performed by: UROLOGY

## 2022-05-03 PROCEDURE — 3079F PR MOST RECENT DIASTOLIC BLOOD PRESSURE 80-89 MM HG: ICD-10-PCS | Mod: CPTII,S$GLB,, | Performed by: UROLOGY

## 2022-05-03 PROCEDURE — 3074F PR MOST RECENT SYSTOLIC BLOOD PRESSURE < 130 MM HG: ICD-10-PCS | Mod: CPTII,S$GLB,, | Performed by: UROLOGY

## 2022-05-03 PROCEDURE — 1159F PR MEDICATION LIST DOCUMENTED IN MEDICAL RECORD: ICD-10-PCS | Mod: CPTII,S$GLB,, | Performed by: UROLOGY

## 2022-05-03 PROCEDURE — 99999 PR PBB SHADOW E&M-EST. PATIENT-LVL III: CPT | Mod: PBBFAC,,, | Performed by: UROLOGY

## 2022-05-03 PROCEDURE — 1159F MED LIST DOCD IN RCRD: CPT | Mod: CPTII,S$GLB,, | Performed by: UROLOGY

## 2022-05-03 PROCEDURE — 3074F SYST BP LT 130 MM HG: CPT | Mod: CPTII,S$GLB,, | Performed by: UROLOGY

## 2022-05-03 PROCEDURE — 99214 PR OFFICE/OUTPT VISIT, EST, LEVL IV, 30-39 MIN: ICD-10-PCS | Mod: S$GLB,,, | Performed by: UROLOGY

## 2022-05-03 RX ORDER — TESTOSTERONE ENANTHATE 75 MG/.5ML
75 INJECTION SUBCUTANEOUS
Qty: 4 EACH | Refills: 5 | Status: SHIPPED | OUTPATIENT
Start: 2022-05-03 | End: 2022-08-31

## 2022-05-03 RX ORDER — TADALAFIL 5 MG/1
5 TABLET ORAL DAILY
Qty: 30 TABLET | Refills: 11 | Status: SHIPPED | OUTPATIENT
Start: 2022-05-03 | End: 2023-05-05 | Stop reason: SDUPTHER

## 2022-05-03 RX ORDER — TROSPIUM CHLORIDE 20 MG/1
20 TABLET, FILM COATED ORAL DAILY
Qty: 30 TABLET | Refills: 11 | Status: SHIPPED | OUTPATIENT
Start: 2022-05-03 | End: 2023-05-05

## 2022-05-03 RX ORDER — TAMSULOSIN HYDROCHLORIDE 0.4 MG/1
1 CAPSULE ORAL NIGHTLY
Qty: 30 CAPSULE | Refills: 11 | Status: SHIPPED | OUTPATIENT
Start: 2022-05-03 | End: 2023-05-05 | Stop reason: SDUPTHER

## 2022-05-03 NOTE — PROGRESS NOTES
CC: low T, BPH    History of Present Illness:     5/3/22- Using xyosted 75mg Q7 days. Took injection 1 day prior to blood work. Can feel his level drop off toward the end. Taking trospium 20mg once daily and seems to be working well. No difficulty emptying. UUI is much improved. EtOH makes UUI worse. Continues to take flomax and cialis.  Erections are okay.   11/23/21- here for testopel. Prostate measures at 44g on ultrasound  11/19/21- here for cysto.   11/5/21-CMP and CBC done at [facility] and fairly normal. On flomax, daily cialis, sanctura and T 400mg IM Q 14 days. It's been 3 weeks since his last injection. Reports that he is gaining fat and not sure why. Eating healthy. Urgency has improved but notices his pants are a little wet. Wearing C-pap.   8/5/21- Pt is a 55yo male here for follow up.  He has testosterone deficiency and has been managed on testopel for years now, but recently switched back to IM testosterone cypionate injections due to insurance. He is also managed on flomax and daily cialis. Wears his C-pap. He reports that he is having insensate incontinence. Also has urgency, which is severe. Also having nocturnal enuresis. Good stream. Feels like he empties.   1 cup of coffee daily. Tried oxybutynin in the past, but can't remember why he stopped.     H/H a little elevated at 17.3/54.7. Has since donated blood.     Review of Systems   Constitutional: Negative for activity change, appetite change, chills and fever.   Respiratory: Negative for chest tightness and shortness of breath.    Cardiovascular: Negative for chest pain.   Gastrointestinal: Negative for abdominal pain.   Genitourinary: Positive for nocturia. Negative for enuresis.   Psychiatric/Behavioral: Negative for confusion.   All other systems reviewed and are negative.      Current Outpatient Medications   Medication Sig Dispense Refill    citalopram (CELEXA) 20 MG tablet Take 20 mg by mouth once daily.      diazePAM (VALIUM) 5 MG tablet  Take 1 po 45 minutes before visit. 2 tablet 0    lisinopriL 10 MG tablet lisinopril 10 mg tabs      testosterone cypionate (DEPOTESTOTERONE CYPIONATE) 200 mg/mL injection Inject 1 mL (200 mg total) into the muscle every 7 days. 4 mL 0    pantoprazole (PROTONIX) 40 MG tablet Take 40 mg by mouth.      tadalafiL (CIALIS) 5 MG tablet Take 1 tablet (5 mg total) by mouth once daily. 30 tablet 11    tamsulosin (FLOMAX) 0.4 mg Cap Take 1 capsule (0.4 mg total) by mouth every evening. 30 capsule 11    testosterone enanthate (XYOSTED) 75 mg/0.5 mL AtIn Inject 75 mg into the skin every 7 days. 4 each 5    trospium (SANCTURA) 20 mg Tab tablet Take 1 tablet (20 mg total) by mouth once daily. 30 tablet 11     No current facility-administered medications for this visit.       Review of patient's allergies indicates:  No Known Allergies    Past medical, family, and social history reviewed as documented in chart with pertinent positive medical, family, and social history detailed in HPI.    Physical Exam  Vitals:    05/03/22 0922   BP: 122/80       General: Well-developed, well-nourished, in no acute distress  HEENT: Normocephalic, atraumatic, extraocular eye movements in tact  Neck: supple, trachea midline, no cervical or supraclavicular adenopathy  Respirations: Even and unlabored  Abdomen: soft, Non-tender, Non-distended, no palpable masses, no rebound or guarding  Rectal: 8/5/21-30gram prostate without nodules or tenderness. No gross blood.   Extremities: Moves all extremities equally, atraumatic, no clubbing, cyanosis, edema  Skin: warm and dry, no lesions  Psych: normal affect  Neuro: Alert and oriented x 3. Cranial nerves II-XII grossly intact    PVR: 39cc 8/5/21    Urinalysis  Negative for blood, LE, nitrite      PSA  4/26/22: 0.72    Testosterone, Total   Date/Time Value Ref Range Status   04/26/2022 09:49  304 - 1227 ng/dL Final         Assessment:   1. Hypogonadism in male  POCT URINE DIPSTICK WITHOUT  MICROSCOPE    Testosterone    CBC Without Differential    Comprehensive Metabolic Panel    Testosterone   2. OAB (overactive bladder)     3. BPH with obstruction/lower urinary tract symptoms  Prostate Specific Antigen, Diagnostic         Plan:  Hypogonadism in male  -     POCT URINE DIPSTICK WITHOUT MICROSCOPE  -     Testosterone; Future; Expected date: 05/03/2022  -     CBC Without Differential; Future; Expected date: 11/17/2022  -     Comprehensive Metabolic Panel; Future; Expected date: 11/17/2022  -     Testosterone; Future; Expected date: 11/17/2022    OAB (overactive bladder)    BPH with obstruction/lower urinary tract symptoms  -     Prostate Specific Antigen, Diagnostic; Future; Expected date: 11/17/2022    Other orders  -     trospium (SANCTURA) 20 mg Tab tablet; Take 1 tablet (20 mg total) by mouth once daily.  Dispense: 30 tablet; Refill: 11  -     tamsulosin (FLOMAX) 0.4 mg Cap; Take 1 capsule (0.4 mg total) by mouth every evening.  Dispense: 30 capsule; Refill: 11  -     tadalafiL (CIALIS) 5 MG tablet; Take 1 tablet (5 mg total) by mouth once daily.  Dispense: 30 tablet; Refill: 11  -     testosterone enanthate (XYOSTED) 75 mg/0.5 mL AtIn; Inject 75 mg into the skin every 7 days.  Dispense: 4 each; Refill: 5       Check Friday testosterone level in a few weeks to assess trough and may need to increase dose if low.       Follow up in about 6 months (around 11/3/2022) for labs before visit.

## 2022-06-17 ENCOUNTER — LAB VISIT (OUTPATIENT)
Dept: LAB | Facility: HOSPITAL | Age: 57
End: 2022-06-17
Attending: UROLOGY
Payer: COMMERCIAL

## 2022-06-17 DIAGNOSIS — E29.1 HYPOGONADISM IN MALE: ICD-10-CM

## 2022-06-17 LAB — TESTOST SERPL-MCNC: 306 NG/DL (ref 304–1227)

## 2022-06-17 PROCEDURE — 84403 ASSAY OF TOTAL TESTOSTERONE: CPT | Performed by: UROLOGY

## 2022-06-17 PROCEDURE — 36415 COLL VENOUS BLD VENIPUNCTURE: CPT | Mod: PO | Performed by: UROLOGY

## 2022-08-10 ENCOUNTER — TELEPHONE (OUTPATIENT)
Dept: UROLOGY | Facility: CLINIC | Age: 57
End: 2022-08-10
Payer: COMMERCIAL

## 2022-08-11 NOTE — TELEPHONE ENCOUNTER
Attempted to contact Raj. Left in hold for 10 mins. Not able to get in  Contact with raj. Advised yaneth that pt scheduled to do labs in Nov

## 2022-08-11 NOTE — TELEPHONE ENCOUNTER
----- Message from Chantell Plummer sent at 8/10/2022  9:25 AM CDT -----  Contact: Immanuel calling from [facility]@[phone]  Immanuel calling to let the nurse know that the blood orders are  and she will be faxing over a form to be filled out. Please call to advise.

## 2022-08-12 ENCOUNTER — TELEPHONE (OUTPATIENT)
Dept: UROLOGY | Facility: CLINIC | Age: 57
End: 2022-08-12
Payer: COMMERCIAL

## 2022-08-12 NOTE — TELEPHONE ENCOUNTER
Orders sent to [facility] [phone]. Spoke to pt regarding this and he has been aware.       ----- Message from Linda Sanchez sent at 8/12/2022  9:08 AM CDT -----  Contact: Nuha/nurse at [facility]  Nuha would like to have some testosterone orders faxed over to [facility] donor center. Please give a call to the patient when it is sent over so he can schedule his appointment [phone].    Fax number: [phone]    Thanks

## 2022-08-15 ENCOUNTER — TELEPHONE (OUTPATIENT)
Dept: UROLOGY | Facility: CLINIC | Age: 57
End: 2022-08-15
Payer: COMMERCIAL

## 2022-08-15 NOTE — TELEPHONE ENCOUNTER
----- Message from Laura Crane LPN sent at 8/15/2022 11:41 AM CDT -----  Contact: [facility] / Audrey [phone]    ----- Message -----  From: Susana Hatch  Sent: 8/15/2022  11:14 AM CDT  To: Rajendra OLIVERA Staff    The patient is there and need an order for Phlebotomy.    Thank you

## 2022-08-15 NOTE — TELEPHONE ENCOUNTER
Called spoke with Maury at [facility] waiting on orders for Testosterone draw. Informed Maury wrong form sent will refax new order.

## 2022-08-16 ENCOUNTER — TELEPHONE (OUTPATIENT)
Dept: UROLOGY | Facility: CLINIC | Age: 57
End: 2022-08-16
Payer: COMMERCIAL

## 2022-08-16 NOTE — TELEPHONE ENCOUNTER
----- Message from Jocy Maurice sent at 8/16/2022  3:33 PM CDT -----  Contact: Audrey/ [facility]  Audrey stated the orders that were sent were last years orders so she is needing updated and the most recent orders. Please fax it to [phone]. Please call her back at [phone]

## 2022-08-16 NOTE — TELEPHONE ENCOUNTER
Called and spoke with pt, informed him that his order has been sent, I did apologize for the delay. Pt verbalized understanding.

## 2022-08-17 ENCOUNTER — TELEPHONE (OUTPATIENT)
Dept: UROLOGY | Facility: CLINIC | Age: 57
End: 2022-08-17
Payer: COMMERCIAL

## 2022-08-17 NOTE — TELEPHONE ENCOUNTER
----- Message from Viki Kendall sent at 8/17/2022  1:19 PM CDT -----  Regarding: orders  Pt calling in regards to order , its  the same order from last year with last yrs date .       A form was faxed over that detailed what was needed .      Confirmed patient's contact info below:  Contact Name: Audrey   Phone Number: [phone]

## 2022-08-17 NOTE — TELEPHONE ENCOUNTER
Called and spoke with Audrey at [facility], informed her that I refaxed the correct order form yesterday after 5pm. Informed her that I will resend it if she would like, she stated that would be great. Refaxed form to [phone].

## 2022-08-31 ENCOUNTER — TELEPHONE (OUTPATIENT)
Dept: UROLOGY | Facility: CLINIC | Age: 57
End: 2022-08-31
Payer: COMMERCIAL

## 2022-08-31 RX ORDER — TESTOSTERONE CYPIONATE 200 MG/ML
200 INJECTION, SOLUTION INTRAMUSCULAR
Qty: 4 ML | Refills: 2 | Status: SHIPPED | OUTPATIENT
Start: 2022-08-31 | End: 2022-11-04 | Stop reason: SDUPTHER

## 2022-08-31 NOTE — TELEPHONE ENCOUNTER
Contacted pt regarding testosterone. Pt states that pharmacy is refusing  refills for his testosterone xyate and will like to get back in the previous prescription. Pt states he's been out for a week and will like for me to contact pharmacy.   After contacting pharmacy. Pharmacy states they did not refused. Pt was advised that his insurance was no accepting the coupon as secondary to lower the payment and there for pharmacy  had to use the good rx coupon as primary which increase the price out of packet of the testosterone. Pt didn't want to pay the price. Pt now requesting previous RX

## 2022-08-31 NOTE — TELEPHONE ENCOUNTER
----- Message from Damaris Allen sent at 8/29/2022  1:17 PM CDT -----  States he left a message last week regarding his injection. States the pharmacy is denying his injection. States he needs his injection or can he go back to his old injection, they will pay for that one. States he would need a new prescription. He would like the nurse to call him back. Please call pt [phone]. Thank you

## 2022-09-01 ENCOUNTER — TELEPHONE (OUTPATIENT)
Dept: UROLOGY | Facility: CLINIC | Age: 57
End: 2022-09-01
Payer: COMMERCIAL

## 2022-09-01 NOTE — TELEPHONE ENCOUNTER
Called and spoke with pt, informed pt that new script was sent to [facility], however it required a prior auth, prior auth was submitted yesterday to [employer] to get approval. Informed pt that once I hear something from them I will let him know. Pt states he has a hard time getting in touch with us. Informed pt the quickest way is thru mychart. Pt states he can not reset it and requested I deactivate it and reactivate it so he can reset it up. Per pt request did just that.

## 2022-09-01 NOTE — TELEPHONE ENCOUNTER
----- Message from Denisha Sandoval sent at 9/1/2022 10:11 AM CDT -----  Contact: Tre  Patient is calling to speak with someone regarding prescription. Patient request refill of older testosterone injections to be sent to pharmacy below. Patient request to be informed if needing to schedule a visit. Please give patient a call back at [phone] as soon as possible.    [facility], [facility] [address] [address]  [address] [address] [address]  Phone: [phone] Fax: [phone]    Thank you,  GH

## 2022-09-07 ENCOUNTER — TELEPHONE (OUTPATIENT)
Dept: UROLOGY | Facility: CLINIC | Age: 57
End: 2022-09-07
Payer: COMMERCIAL

## 2022-09-07 RX ORDER — TESTOSTERONE CYPIONATE 200 MG/ML
200 INJECTION, SOLUTION INTRAMUSCULAR
Qty: 4 ML | Refills: 2 | Status: CANCELLED | OUTPATIENT
Start: 2022-09-07

## 2022-09-07 NOTE — TELEPHONE ENCOUNTER
Called and spoke with pt, pt states pharmacy saying medication isnt going through, pt wife on other line with [facility], informed pt to have pharmacy rerun his prescription again because we have an approval letter showing it approved. Pharmacy told pt they will rerun it and let them know if it goes through.

## 2022-09-07 NOTE — TELEPHONE ENCOUNTER
----- Message from Karina Ybarra sent at 9/7/2022  8:39 AM CDT -----  Contact: self/[phone]  Patient is calling regarding his injection shot, he would like for Dr Drew to call him back at [phone]. He states he is 4 weeks within it. Please call him back. Thanks/ar

## 2022-11-01 ENCOUNTER — LAB VISIT (OUTPATIENT)
Dept: LAB | Facility: HOSPITAL | Age: 57
End: 2022-11-01
Attending: UROLOGY
Payer: COMMERCIAL

## 2022-11-01 DIAGNOSIS — N40.1 BPH WITH OBSTRUCTION/LOWER URINARY TRACT SYMPTOMS: ICD-10-CM

## 2022-11-01 DIAGNOSIS — E29.1 HYPOGONADISM IN MALE: ICD-10-CM

## 2022-11-01 DIAGNOSIS — N13.8 BPH WITH OBSTRUCTION/LOWER URINARY TRACT SYMPTOMS: ICD-10-CM

## 2022-11-01 LAB
ALBUMIN SERPL BCP-MCNC: 3.8 G/DL (ref 3.5–5.2)
ALP SERPL-CCNC: 42 U/L (ref 55–135)
ALT SERPL W/O P-5'-P-CCNC: 21 U/L (ref 10–44)
ANION GAP SERPL CALC-SCNC: 6 MMOL/L (ref 8–16)
AST SERPL-CCNC: 16 U/L (ref 10–40)
BILIRUB SERPL-MCNC: 0.4 MG/DL (ref 0.1–1)
BUN SERPL-MCNC: 10 MG/DL (ref 6–20)
CALCIUM SERPL-MCNC: 9.6 MG/DL (ref 8.7–10.5)
CHLORIDE SERPL-SCNC: 101 MMOL/L (ref 95–110)
CO2 SERPL-SCNC: 30 MMOL/L (ref 23–29)
COMPLEXED PSA SERPL-MCNC: 0.58 NG/ML (ref 0–4)
CREAT SERPL-MCNC: 1 MG/DL (ref 0.5–1.4)
ERYTHROCYTE [DISTWIDTH] IN BLOOD BY AUTOMATED COUNT: 20.3 % (ref 11.5–14.5)
EST. GFR  (NO RACE VARIABLE): >60 ML/MIN/1.73 M^2
GLUCOSE SERPL-MCNC: 112 MG/DL (ref 70–110)
HCT VFR BLD AUTO: 51.5 % (ref 40–54)
HGB BLD-MCNC: 14.7 G/DL (ref 14–18)
MCH RBC QN AUTO: 21.7 PG (ref 27–31)
MCHC RBC AUTO-ENTMCNC: 28.5 G/DL (ref 32–36)
MCV RBC AUTO: 76 FL (ref 82–98)
PLATELET # BLD AUTO: 184 K/UL (ref 150–450)
PMV BLD AUTO: 11.2 FL (ref 9.2–12.9)
POTASSIUM SERPL-SCNC: 4.4 MMOL/L (ref 3.5–5.1)
PROT SERPL-MCNC: 7.3 G/DL (ref 6–8.4)
RBC # BLD AUTO: 6.78 M/UL (ref 4.6–6.2)
SODIUM SERPL-SCNC: 137 MMOL/L (ref 136–145)
TESTOST SERPL-MCNC: 732 NG/DL (ref 304–1227)
WBC # BLD AUTO: 7.86 K/UL (ref 3.9–12.7)

## 2022-11-01 PROCEDURE — 36415 COLL VENOUS BLD VENIPUNCTURE: CPT | Mod: PO | Performed by: UROLOGY

## 2022-11-01 PROCEDURE — 84153 ASSAY OF PSA TOTAL: CPT | Performed by: UROLOGY

## 2022-11-01 PROCEDURE — 84403 ASSAY OF TOTAL TESTOSTERONE: CPT | Performed by: UROLOGY

## 2022-11-01 PROCEDURE — 85027 COMPLETE CBC AUTOMATED: CPT | Performed by: UROLOGY

## 2022-11-01 PROCEDURE — 80053 COMPREHEN METABOLIC PANEL: CPT | Performed by: UROLOGY

## 2022-11-04 ENCOUNTER — OFFICE VISIT (OUTPATIENT)
Dept: UROLOGY | Facility: CLINIC | Age: 57
End: 2022-11-04
Payer: COMMERCIAL

## 2022-11-04 ENCOUNTER — LAB VISIT (OUTPATIENT)
Dept: LAB | Facility: HOSPITAL | Age: 57
End: 2022-11-04
Attending: UROLOGY
Payer: COMMERCIAL

## 2022-11-04 VITALS — WEIGHT: 258.63 LBS | SYSTOLIC BLOOD PRESSURE: 130 MMHG | BODY MASS INDEX: 40.5 KG/M2 | DIASTOLIC BLOOD PRESSURE: 80 MMHG

## 2022-11-04 DIAGNOSIS — E34.9 TESTOSTERONE DEFICIENCY: Primary | ICD-10-CM

## 2022-11-04 DIAGNOSIS — E66.01 MORBID OBESITY: ICD-10-CM

## 2022-11-04 DIAGNOSIS — N40.1 BPH WITH OBSTRUCTION/LOWER URINARY TRACT SYMPTOMS: ICD-10-CM

## 2022-11-04 DIAGNOSIS — N13.8 BPH WITH OBSTRUCTION/LOWER URINARY TRACT SYMPTOMS: ICD-10-CM

## 2022-11-04 DIAGNOSIS — N39.41 URGE INCONTINENCE: ICD-10-CM

## 2022-11-04 PROCEDURE — 1160F RVW MEDS BY RX/DR IN RCRD: CPT | Mod: CPTII,S$GLB,, | Performed by: UROLOGY

## 2022-11-04 PROCEDURE — 99999 PR PBB SHADOW E&M-EST. PATIENT-LVL II: ICD-10-PCS | Mod: PBBFAC,,, | Performed by: UROLOGY

## 2022-11-04 PROCEDURE — 3075F SYST BP GE 130 - 139MM HG: CPT | Mod: CPTII,S$GLB,, | Performed by: UROLOGY

## 2022-11-04 PROCEDURE — 3075F PR MOST RECENT SYSTOLIC BLOOD PRESS GE 130-139MM HG: ICD-10-PCS | Mod: CPTII,S$GLB,, | Performed by: UROLOGY

## 2022-11-04 PROCEDURE — 3079F PR MOST RECENT DIASTOLIC BLOOD PRESSURE 80-89 MM HG: ICD-10-PCS | Mod: CPTII,S$GLB,, | Performed by: UROLOGY

## 2022-11-04 PROCEDURE — 84443 ASSAY THYROID STIM HORMONE: CPT | Performed by: UROLOGY

## 2022-11-04 PROCEDURE — 3008F PR BODY MASS INDEX (BMI) DOCUMENTED: ICD-10-PCS | Mod: CPTII,S$GLB,, | Performed by: UROLOGY

## 2022-11-04 PROCEDURE — 1160F PR REVIEW ALL MEDS BY PRESCRIBER/CLIN PHARMACIST DOCUMENTED: ICD-10-PCS | Mod: CPTII,S$GLB,, | Performed by: UROLOGY

## 2022-11-04 PROCEDURE — 84480 ASSAY TRIIODOTHYRONINE (T3): CPT | Performed by: UROLOGY

## 2022-11-04 PROCEDURE — 4010F PR ACE/ARB THEARPY RXD/TAKEN: ICD-10-PCS | Mod: CPTII,S$GLB,, | Performed by: UROLOGY

## 2022-11-04 PROCEDURE — 4010F ACE/ARB THERAPY RXD/TAKEN: CPT | Mod: CPTII,S$GLB,, | Performed by: UROLOGY

## 2022-11-04 PROCEDURE — 3079F DIAST BP 80-89 MM HG: CPT | Mod: CPTII,S$GLB,, | Performed by: UROLOGY

## 2022-11-04 PROCEDURE — 1159F MED LIST DOCD IN RCRD: CPT | Mod: CPTII,S$GLB,, | Performed by: UROLOGY

## 2022-11-04 PROCEDURE — 1159F PR MEDICATION LIST DOCUMENTED IN MEDICAL RECORD: ICD-10-PCS | Mod: CPTII,S$GLB,, | Performed by: UROLOGY

## 2022-11-04 PROCEDURE — 99214 PR OFFICE/OUTPT VISIT, EST, LEVL IV, 30-39 MIN: ICD-10-PCS | Mod: S$GLB,,, | Performed by: UROLOGY

## 2022-11-04 PROCEDURE — 36415 COLL VENOUS BLD VENIPUNCTURE: CPT | Mod: PN | Performed by: UROLOGY

## 2022-11-04 PROCEDURE — 3008F BODY MASS INDEX DOCD: CPT | Mod: CPTII,S$GLB,, | Performed by: UROLOGY

## 2022-11-04 PROCEDURE — 99999 PR PBB SHADOW E&M-EST. PATIENT-LVL II: CPT | Mod: PBBFAC,,, | Performed by: UROLOGY

## 2022-11-04 PROCEDURE — 99214 OFFICE O/P EST MOD 30 MIN: CPT | Mod: S$GLB,,, | Performed by: UROLOGY

## 2022-11-04 PROCEDURE — 84436 ASSAY OF TOTAL THYROXINE: CPT | Performed by: UROLOGY

## 2022-11-04 RX ORDER — TESTOSTERONE CYPIONATE 200 MG/ML
200 INJECTION, SOLUTION INTRAMUSCULAR
Qty: 4 ML | Refills: 5 | Status: SHIPPED | OUTPATIENT
Start: 2022-11-04 | End: 2023-11-07 | Stop reason: SDUPTHER

## 2022-11-04 NOTE — PROGRESS NOTES
CC: low T, BPH    History of Present Illness:     11/4/22- Recent testosterone level was good at 732 1 day after injection. Feels like he is having pretty good energy. Patient has been on testosterone cypionate 200mg IM Q7 days. Also on daily cialis and flomax. He continues to have UUI and changes clothing when it happens. States that stream is great.     5/3/22- Using xyosted 75mg Q7 days. Took injection 1 day prior to blood work. Can feel his level drop off toward the end. Taking trospium 20mg once daily and seems to be working well. No difficulty emptying. UUI is much improved. EtOH makes UUI worse. Continues to take flomax and cialis.  Erections are okay.   11/23/21- here for testopel. Prostate measures at 44g on ultrasound  11/19/21- here for cysto.   11/5/21-CMP and CBC done at [facility] and fairly normal. On flomax, daily cialis, sanctura and T 400mg IM Q 14 days. It's been 3 weeks since his last injection. Reports that he is gaining fat and not sure why. Eating healthy. Urgency has improved but notices his pants are a little wet. Wearing C-pap.   8/5/21- Pt is a 55yo male here for follow up.  He has testosterone deficiency and has been managed on testopel for years now, but recently switched back to IM testosterone cypionate injections due to insurance. He is also managed on flomax and daily cialis. Wears his C-pap. He reports that he is having insensate incontinence. Also has urgency, which is severe. Also having nocturnal enuresis. Good stream. Feels like he empties.   1 cup of coffee daily. Tried oxybutynin in the past, but can't remember why he stopped.     H/H a little elevated at 17.3/54.7. Has since donated blood.     Review of Systems   Constitutional:  Negative for activity change, appetite change, chills and fever.   Respiratory:  Negative for chest tightness and shortness of breath.    Cardiovascular:  Negative for chest pain.   Gastrointestinal:  Negative for abdominal pain.   Genitourinary:   Positive for nocturia. Negative for enuresis.   Psychiatric/Behavioral:  Negative for confusion.    All other systems reviewed and are negative.    Current Outpatient Medications   Medication Sig Dispense Refill    lisinopriL 10 MG tablet lisinopril 10 mg tabs      pantoprazole (PROTONIX) 40 MG tablet Take 40 mg by mouth.      tadalafiL (CIALIS) 5 MG tablet Take 1 tablet (5 mg total) by mouth once daily. 30 tablet 11    tamsulosin (FLOMAX) 0.4 mg Cap Take 1 capsule (0.4 mg total) by mouth every evening. 30 capsule 11    citalopram (CELEXA) 20 MG tablet Take 20 mg by mouth once daily.      diazePAM (VALIUM) 5 MG tablet Take 1 po 45 minutes before visit. (Patient not taking: Reported on 11/4/2022) 2 tablet 0    mirabegron (MYRBETRIQ) 50 mg Tb24 Take 1 tablet (50 mg total) by mouth once daily. 30 tablet 11    testosterone cypionate (DEPOTESTOTERONE CYPIONATE) 200 mg/mL injection Inject 1 mL (200 mg total) into the muscle every 7 days. 4 mL 5    trospium (SANCTURA) 20 mg Tab tablet Take 1 tablet (20 mg total) by mouth once daily. (Patient not taking: Reported on 11/4/2022) 30 tablet 11     No current facility-administered medications for this visit.       Review of patient's allergies indicates:  No Known Allergies    Past medical, family, and social history reviewed as documented in chart with pertinent positive medical, family, and social history detailed in HPI.    Physical Exam  Vitals:    11/04/22 1006   BP: 130/80       General: Well-developed, well-nourished, in no acute distress  HEENT: Normocephalic, atraumatic, extraocular eye movements in tact  Neck: supple, trachea midline, no cervical or supraclavicular adenopathy  Respirations: Even and unlabored  Abdomen: soft, Non-tender, Non-distended, no palpable masses, no rebound or guarding  Rectal: 11/4/22- 40gram prostate without nodules or tenderness. No gross blood.   Extremities: Moves all extremities equally, atraumatic, no clubbing, cyanosis, edema  Skin:  warm and dry, no lesions  Psych: normal affect  Neuro: Alert and oriented x 3. Cranial nerves II-XII grossly intact    PVR: 39cc 8/5/21        PSA  4/26/22: 0.72  11/1/22: 0.58    Testosterone, Total   Date/Time Value Ref Range Status   11/01/2022 09:14  304 - 1227 ng/dL Final         Assessment:   1. Testosterone deficiency  CBC Without Differential    Comprehensive Metabolic Panel    Testosterone      2. BPH with obstruction/lower urinary tract symptoms  Prostate Specific Antigen, Diagnostic      3. Morbid obesity  TSH    T4    T3    Lipid Panel      4. Urge incontinence                Plan:  Testosterone deficiency  -     CBC Without Differential; Future; Expected date: 05/04/2023  -     Comprehensive Metabolic Panel; Future; Expected date: 05/04/2023  -     Testosterone; Future; Expected date: 05/04/2023    BPH with obstruction/lower urinary tract symptoms  -     Prostate Specific Antigen, Diagnostic; Future; Expected date: 05/04/2023    Morbid obesity  -     TSH; Future; Expected date: 11/04/2022  -     T4; Future; Expected date: 11/04/2022  -     T3; Future; Expected date: 11/04/2022  -     Lipid Panel; Future; Expected date: 05/04/2023    Urge incontinence    Other orders  -     mirabegron (MYRBETRIQ) 50 mg Tb24; Take 1 tablet (50 mg total) by mouth once daily.  Dispense: 30 tablet; Refill: 11  -     testosterone cypionate (DEPOTESTOTERONE CYPIONATE) 200 mg/mL injection; Inject 1 mL (200 mg total) into the muscle every 7 days.  Dispense: 4 mL; Refill: 5         Continue flomax and cialis  States that when UroLift was previously scheduled, it was going to cost too much out of pocket, but he may be interested in pursuing surgery after the new year, UroLift vs TURP. He will contact us if he wants to schedule. Otherwise, will try myrbetriq for now. Pt has failed trospium and oxybutynin.   Follow up in about 6 months (around 5/4/2023) for labs before visit.

## 2022-11-05 LAB
T3 SERPL-MCNC: 97 NG/DL (ref 60–180)
T4 SERPL-MCNC: 3.8 UG/DL (ref 4.5–11.5)
TSH SERPL DL<=0.005 MIU/L-ACNC: 2.65 UIU/ML (ref 0.4–4)

## 2023-05-01 ENCOUNTER — LAB VISIT (OUTPATIENT)
Dept: LAB | Facility: HOSPITAL | Age: 58
End: 2023-05-01
Attending: UROLOGY
Payer: COMMERCIAL

## 2023-05-01 DIAGNOSIS — E34.9 TESTOSTERONE DEFICIENCY: ICD-10-CM

## 2023-05-01 DIAGNOSIS — N40.1 BPH WITH OBSTRUCTION/LOWER URINARY TRACT SYMPTOMS: ICD-10-CM

## 2023-05-01 DIAGNOSIS — N13.8 BPH WITH OBSTRUCTION/LOWER URINARY TRACT SYMPTOMS: ICD-10-CM

## 2023-05-01 DIAGNOSIS — E66.01 MORBID OBESITY: ICD-10-CM

## 2023-05-01 LAB
ALBUMIN SERPL BCP-MCNC: 3.8 G/DL (ref 3.5–5.2)
ALP SERPL-CCNC: 43 U/L (ref 55–135)
ALT SERPL W/O P-5'-P-CCNC: 28 U/L (ref 10–44)
ANION GAP SERPL CALC-SCNC: 6 MMOL/L (ref 8–16)
AST SERPL-CCNC: 23 U/L (ref 10–40)
BILIRUB SERPL-MCNC: 0.3 MG/DL (ref 0.1–1)
BUN SERPL-MCNC: 15 MG/DL (ref 6–20)
CALCIUM SERPL-MCNC: 9.4 MG/DL (ref 8.7–10.5)
CHLORIDE SERPL-SCNC: 105 MMOL/L (ref 95–110)
CHOLEST SERPL-MCNC: 212 MG/DL (ref 120–199)
CHOLEST/HDLC SERPL: 7.1 {RATIO} (ref 2–5)
CO2 SERPL-SCNC: 28 MMOL/L (ref 23–29)
COMPLEXED PSA SERPL-MCNC: 0.39 NG/ML (ref 0–4)
CREAT SERPL-MCNC: 0.9 MG/DL (ref 0.5–1.4)
ERYTHROCYTE [DISTWIDTH] IN BLOOD BY AUTOMATED COUNT: 20.9 % (ref 11.5–14.5)
EST. GFR  (NO RACE VARIABLE): >60 ML/MIN/1.73 M^2
GLUCOSE SERPL-MCNC: 129 MG/DL (ref 70–110)
HCT VFR BLD AUTO: 51 % (ref 40–54)
HDLC SERPL-MCNC: 30 MG/DL (ref 40–75)
HDLC SERPL: 14.2 % (ref 20–50)
HGB BLD-MCNC: 15.3 G/DL (ref 14–18)
LDLC SERPL CALC-MCNC: ABNORMAL MG/DL (ref 63–159)
MCH RBC QN AUTO: 21.6 PG (ref 27–31)
MCHC RBC AUTO-ENTMCNC: 30 G/DL (ref 32–36)
MCV RBC AUTO: 72 FL (ref 82–98)
NONHDLC SERPL-MCNC: 182 MG/DL
PLATELET # BLD AUTO: 170 K/UL (ref 150–450)
PMV BLD AUTO: ABNORMAL FL (ref 9.2–12.9)
POTASSIUM SERPL-SCNC: 4.2 MMOL/L (ref 3.5–5.1)
PROT SERPL-MCNC: 7.5 G/DL (ref 6–8.4)
RBC # BLD AUTO: 7.07 M/UL (ref 4.6–6.2)
SODIUM SERPL-SCNC: 139 MMOL/L (ref 136–145)
TESTOST SERPL-MCNC: 517 NG/DL (ref 304–1227)
TRIGL SERPL-MCNC: 416 MG/DL (ref 30–150)
WBC # BLD AUTO: 6.34 K/UL (ref 3.9–12.7)

## 2023-05-01 PROCEDURE — 85027 COMPLETE CBC AUTOMATED: CPT | Performed by: UROLOGY

## 2023-05-01 PROCEDURE — 80061 LIPID PANEL: CPT | Performed by: UROLOGY

## 2023-05-01 PROCEDURE — 84153 ASSAY OF PSA TOTAL: CPT | Performed by: UROLOGY

## 2023-05-01 PROCEDURE — 80053 COMPREHEN METABOLIC PANEL: CPT | Performed by: UROLOGY

## 2023-05-01 PROCEDURE — 36415 COLL VENOUS BLD VENIPUNCTURE: CPT | Mod: PN | Performed by: UROLOGY

## 2023-05-01 PROCEDURE — 84403 ASSAY OF TOTAL TESTOSTERONE: CPT | Performed by: UROLOGY

## 2023-05-05 ENCOUNTER — OFFICE VISIT (OUTPATIENT)
Dept: UROLOGY | Facility: CLINIC | Age: 58
End: 2023-05-05
Payer: COMMERCIAL

## 2023-05-05 VITALS
DIASTOLIC BLOOD PRESSURE: 77 MMHG | RESPIRATION RATE: 18 BRPM | HEART RATE: 91 BPM | WEIGHT: 270.94 LBS | HEIGHT: 67 IN | SYSTOLIC BLOOD PRESSURE: 128 MMHG | BODY MASS INDEX: 42.53 KG/M2

## 2023-05-05 DIAGNOSIS — E78.1 HYPERTRIGLYCERIDEMIA: ICD-10-CM

## 2023-05-05 DIAGNOSIS — D75.1 POLYCYTHEMIA: ICD-10-CM

## 2023-05-05 DIAGNOSIS — E34.9 TESTOSTERONE DEFICIENCY: Primary | ICD-10-CM

## 2023-05-05 DIAGNOSIS — N40.1 BPH WITH OBSTRUCTION/LOWER URINARY TRACT SYMPTOMS: ICD-10-CM

## 2023-05-05 DIAGNOSIS — N32.81 OAB (OVERACTIVE BLADDER): ICD-10-CM

## 2023-05-05 DIAGNOSIS — N13.8 BPH WITH OBSTRUCTION/LOWER URINARY TRACT SYMPTOMS: ICD-10-CM

## 2023-05-05 PROCEDURE — 3078F PR MOST RECENT DIASTOLIC BLOOD PRESSURE < 80 MM HG: ICD-10-PCS | Mod: CPTII,S$GLB,, | Performed by: UROLOGY

## 2023-05-05 PROCEDURE — 3074F SYST BP LT 130 MM HG: CPT | Mod: CPTII,S$GLB,, | Performed by: UROLOGY

## 2023-05-05 PROCEDURE — 99999 PR PBB SHADOW E&M-EST. PATIENT-LVL III: CPT | Mod: PBBFAC,,, | Performed by: UROLOGY

## 2023-05-05 PROCEDURE — 3074F PR MOST RECENT SYSTOLIC BLOOD PRESSURE < 130 MM HG: ICD-10-PCS | Mod: CPTII,S$GLB,, | Performed by: UROLOGY

## 2023-05-05 PROCEDURE — 1159F PR MEDICATION LIST DOCUMENTED IN MEDICAL RECORD: ICD-10-PCS | Mod: CPTII,S$GLB,, | Performed by: UROLOGY

## 2023-05-05 PROCEDURE — 4010F ACE/ARB THERAPY RXD/TAKEN: CPT | Mod: CPTII,S$GLB,, | Performed by: UROLOGY

## 2023-05-05 PROCEDURE — 3008F PR BODY MASS INDEX (BMI) DOCUMENTED: ICD-10-PCS | Mod: CPTII,S$GLB,, | Performed by: UROLOGY

## 2023-05-05 PROCEDURE — 1159F MED LIST DOCD IN RCRD: CPT | Mod: CPTII,S$GLB,, | Performed by: UROLOGY

## 2023-05-05 PROCEDURE — 3078F DIAST BP <80 MM HG: CPT | Mod: CPTII,S$GLB,, | Performed by: UROLOGY

## 2023-05-05 PROCEDURE — 3008F BODY MASS INDEX DOCD: CPT | Mod: CPTII,S$GLB,, | Performed by: UROLOGY

## 2023-05-05 PROCEDURE — 99999 PR PBB SHADOW E&M-EST. PATIENT-LVL III: ICD-10-PCS | Mod: PBBFAC,,, | Performed by: UROLOGY

## 2023-05-05 PROCEDURE — 99214 PR OFFICE/OUTPT VISIT, EST, LEVL IV, 30-39 MIN: ICD-10-PCS | Mod: S$GLB,,, | Performed by: UROLOGY

## 2023-05-05 PROCEDURE — 99214 OFFICE O/P EST MOD 30 MIN: CPT | Mod: S$GLB,,, | Performed by: UROLOGY

## 2023-05-05 PROCEDURE — 4010F PR ACE/ARB THEARPY RXD/TAKEN: ICD-10-PCS | Mod: CPTII,S$GLB,, | Performed by: UROLOGY

## 2023-05-05 RX ORDER — NEEDLES, DISPOSABLE 25GX5/8"
NEEDLE, DISPOSABLE MISCELLANEOUS
Qty: 12 EACH | Refills: 4 | Status: SHIPPED | OUTPATIENT
Start: 2023-05-05 | End: 2023-11-07 | Stop reason: SDUPTHER

## 2023-05-05 RX ORDER — TADALAFIL 5 MG/1
5 TABLET ORAL DAILY
Qty: 30 TABLET | Refills: 11 | Status: SHIPPED | OUTPATIENT
Start: 2023-05-05

## 2023-05-05 RX ORDER — TESTOSTERONE CYPIONATE 200 MG/ML
200 INJECTION, SOLUTION INTRAMUSCULAR
Qty: 4 ML | Refills: 5 | Status: SHIPPED | OUTPATIENT
Start: 2023-05-05 | End: 2023-11-03

## 2023-05-05 RX ORDER — TAMSULOSIN HYDROCHLORIDE 0.4 MG/1
1 CAPSULE ORAL NIGHTLY
Qty: 30 CAPSULE | Refills: 11 | Status: SHIPPED | OUTPATIENT
Start: 2023-05-05

## 2023-05-05 RX ORDER — SYRINGE, DISPOSABLE, 3 ML
SYRINGE, EMPTY DISPOSABLE MISCELLANEOUS
Qty: 12 EACH | Refills: 4 | Status: SHIPPED | OUTPATIENT
Start: 2023-05-05 | End: 2023-11-07 | Stop reason: SDUPTHER

## 2023-05-05 RX ORDER — NEEDLES, DISPOSABLE 23GX1 1/2"
NEEDLE, DISPOSABLE MISCELLANEOUS
Qty: 12 EACH | Refills: 4 | Status: SHIPPED | OUTPATIENT
Start: 2023-05-05 | End: 2023-11-07 | Stop reason: SDUPTHER

## 2023-05-05 NOTE — PROGRESS NOTES
CC: low T, BPH    History of Present Illness:     5/5/23-Myrbetriq does help, but he still has some UUI. Stream is good. Energy is not great, but sometimes missing injections and then doubling up. No gross hematuria.   11/4/22- Recent testosterone level was good at 732 1 day after injection. Feels like he is having pretty good energy. Patient has been on testosterone cypionate 200mg IM Q7 days. Also on daily cialis and flomax. He continues to have UUI and changes clothing when it happens. States that stream is great.     5/3/22- Using xyosted 75mg Q7 days. Took injection 1 day prior to blood work. Can feel his level drop off toward the end. Taking trospium 20mg once daily and seems to be working well. No difficulty emptying. UUI is much improved. EtOH makes UUI worse. Continues to take flomax and cialis.  Erections are okay.   11/23/21- here for testopel. Prostate measures at 44g on ultrasound  11/19/21- here for cysto.   11/5/21-CMP and CBC done at [facility] and fairly normal. On flomax, daily cialis, sanctura and T 400mg IM Q 14 days. It's been 3 weeks since his last injection. Reports that he is gaining fat and not sure why. Eating healthy. Urgency has improved but notices his pants are a little wet. Wearing C-pap.   8/5/21- Pt is a 57yo male here for follow up.  He has testosterone deficiency and has been managed on testopel for years now, but recently switched back to IM testosterone cypionate injections due to insurance. He is also managed on flomax and daily cialis. Wears his C-pap. He reports that he is having insensate incontinence. Also has urgency, which is severe. Also having nocturnal enuresis. Good stream. Feels like he empties.   1 cup of coffee daily. Tried oxybutynin in the past, but can't remember why he stopped.     H/H a little elevated at 17.3/54.7. Has since donated blood.     Review of Systems   Constitutional:  Negative for activity change, appetite change, chills and fever.   Respiratory:   "Negative for chest tightness and shortness of breath.    Cardiovascular:  Negative for chest pain.   Gastrointestinal:  Negative for abdominal pain.   Genitourinary:  Positive for nocturia. Negative for enuresis.   Psychiatric/Behavioral:  Negative for confusion.    All other systems reviewed and are negative.    Current Outpatient Medications   Medication Sig Dispense Refill    citalopram (CELEXA) 20 MG tablet Take 20 mg by mouth once daily.      lisinopriL 10 MG tablet lisinopril 10 mg tabs      testosterone cypionate (DEPOTESTOTERONE CYPIONATE) 200 mg/mL injection Inject 1 mL (200 mg total) into the muscle every 7 days. 4 mL 5    mirabegron (MYRBETRIQ) 50 mg Tb24 Take 1 tablet (50 mg total) by mouth once daily. 30 tablet 11    needle, disp, 18 G (HYPODERMIC NEEDLES) 18 gauge x 1 1/2" Ndle Use as directed 12 each 4    needle, disp, 23 gauge (EASY TOUCH HYPODERMIC NEEDLE) 23 gauge x 1 1/2" Ndle Use as directed 12 each 4    pantoprazole (PROTONIX) 40 MG tablet Take 40 mg by mouth.      syringe, disposable, 3 mL Syrg Use as directed 12 each 4    tadalafiL (CIALIS) 5 MG tablet Take 1 tablet (5 mg total) by mouth once daily. 30 tablet 11    tamsulosin (FLOMAX) 0.4 mg Cap Take 1 capsule (0.4 mg total) by mouth every evening. 30 capsule 11    testosterone cypionate (DEPOTESTOTERONE CYPIONATE) 200 mg/mL injection Inject 1 mL (200 mg total) into the muscle every 7 days. 4 mL 5     No current facility-administered medications for this visit.       Review of patient's allergies indicates:  No Known Allergies    Past medical, family, and social history reviewed as documented in chart with pertinent positive medical, family, and social history detailed in HPI.    Physical Exam  Vitals:    05/05/23 0916   BP: 128/77   Pulse: 91   Resp: 18       General: Well-developed, well-nourished, in no acute distress  HEENT: Normocephalic, atraumatic, extraocular eye movements in tact  Neck: supple, trachea midline, no cervical or " supraclavicular adenopathy  Respirations: Even and unlabored  Abdomen: soft, Non-tender, Non-distended, no palpable masses, no rebound or guarding  Rectal: 11/4/22- 40gram prostate without nodules or tenderness. No gross blood.   Extremities: Moves all extremities equally, atraumatic, no clubbing, cyanosis, edema  Skin: warm and dry, no lesions  Psych: normal affect  Neuro: Alert and oriented x 3. Cranial nerves II-XII grossly intact    PVR: 39cc 8/5/21      PSA  4/26/22: 0.72  11/1/22: 0.58  5/1/23: 0.39      Testosterone, Total   Date/Time Value Ref Range Status   05/01/2023 09:10  304 - 1227 ng/dL Final         Assessment:   1. Testosterone deficiency  CBC Without Differential    Comprehensive Metabolic Panel    Testosterone      2. Hypertriglyceridemia  Lipid Panel      3. BPH with obstruction/lower urinary tract symptoms  Prostate Specific Antigen, Diagnostic      4. OAB (overactive bladder)        5. Polycythemia                  Plan:  Testosterone deficiency  -     CBC Without Differential; Future; Expected date: 05/05/2023  -     Comprehensive Metabolic Panel; Future; Expected date: 05/05/2023  -     Testosterone; Future; Expected date: 05/05/2023    Hypertriglyceridemia  -     Lipid Panel; Future; Expected date: 05/05/2023    BPH with obstruction/lower urinary tract symptoms  -     Prostate Specific Antigen, Diagnostic; Future; Expected date: 05/05/2023    OAB (overactive bladder)    Polycythemia    Other orders  -     testosterone cypionate (DEPOTESTOTERONE CYPIONATE) 200 mg/mL injection; Inject 1 mL (200 mg total) into the muscle every 7 days.  Dispense: 4 mL; Refill: 5  -     tadalafiL (CIALIS) 5 MG tablet; Take 1 tablet (5 mg total) by mouth once daily.  Dispense: 30 tablet; Refill: 11  -     tamsulosin (FLOMAX) 0.4 mg Cap; Take 1 capsule (0.4 mg total) by mouth every evening.  Dispense: 30 capsule; Refill: 11  -     mirabegron (MYRBETRIQ) 50 mg Tb24; Take 1 tablet (50 mg total) by mouth once  "daily.  Dispense: 30 tablet; Refill: 11  -     needle, disp, 18 G (HYPODERMIC NEEDLES) 18 gauge x 1 1/2" Ndle; Use as directed  Dispense: 12 each; Refill: 4  -     needle, disp, 23 gauge (EASY TOUCH HYPODERMIC NEEDLE) 23 gauge x 1 1/2" Ndle; Use as directed  Dispense: 12 each; Refill: 4  -     syringe, disposable, 3 mL Syrg; Use as directed  Dispense: 12 each; Refill: 4        Continue PRN phelbotomy  Discussed high triglycerides- pt to follow up with his PCP.   Continue flomax and cialis  Still wants to think about UroLift  Follow up in about 6 months (around 11/5/2023) for labs before visit.                    "

## 2023-08-03 ENCOUNTER — TELEPHONE (OUTPATIENT)
Dept: UROLOGY | Facility: CLINIC | Age: 58
End: 2023-08-03
Payer: COMMERCIAL

## 2023-08-03 NOTE — TELEPHONE ENCOUNTER
Spoke with pharmacy gave new order to dispense 10ml of testosterone because 4ml vials weren't available.

## 2023-08-03 NOTE — TELEPHONE ENCOUNTER
----- Message from Cassidy Ceron sent at 8/3/2023  3:56 PM CDT -----  Contact: [facility]  [facility] is calling to speak with the nurse regarding medication testosterone cypionate (DEPOTESTOTERONE CYPIONATE) 4/mL injection. Explains is out of the 4 ml and switch to the 10 ml. Please give a call back at [phone] and fax # [phone] as requested.  Thanks  LR

## 2023-11-02 ENCOUNTER — LAB VISIT (OUTPATIENT)
Dept: LAB | Facility: HOSPITAL | Age: 58
End: 2023-11-02
Attending: UROLOGY
Payer: COMMERCIAL

## 2023-11-02 DIAGNOSIS — E78.1 HYPERTRIGLYCERIDEMIA: ICD-10-CM

## 2023-11-02 DIAGNOSIS — N13.8 BPH WITH OBSTRUCTION/LOWER URINARY TRACT SYMPTOMS: ICD-10-CM

## 2023-11-02 DIAGNOSIS — N40.1 BPH WITH OBSTRUCTION/LOWER URINARY TRACT SYMPTOMS: ICD-10-CM

## 2023-11-02 DIAGNOSIS — E34.9 TESTOSTERONE DEFICIENCY: ICD-10-CM

## 2023-11-02 LAB
ALBUMIN SERPL BCP-MCNC: 3.9 G/DL (ref 3.5–5.2)
ALP SERPL-CCNC: 33 U/L (ref 55–135)
ALT SERPL W/O P-5'-P-CCNC: 27 U/L (ref 10–44)
ANION GAP SERPL CALC-SCNC: 8 MMOL/L (ref 8–16)
AST SERPL-CCNC: 17 U/L (ref 10–40)
BILIRUB SERPL-MCNC: 0.3 MG/DL (ref 0.1–1)
BUN SERPL-MCNC: 13 MG/DL (ref 6–20)
CALCIUM SERPL-MCNC: 9.9 MG/DL (ref 8.7–10.5)
CHLORIDE SERPL-SCNC: 102 MMOL/L (ref 95–110)
CHOLEST SERPL-MCNC: 168 MG/DL (ref 120–199)
CHOLEST/HDLC SERPL: 5.6 {RATIO} (ref 2–5)
CO2 SERPL-SCNC: 26 MMOL/L (ref 23–29)
COMPLEXED PSA SERPL-MCNC: 0.67 NG/ML (ref 0–4)
CREAT SERPL-MCNC: 1 MG/DL (ref 0.5–1.4)
ERYTHROCYTE [DISTWIDTH] IN BLOOD BY AUTOMATED COUNT: 22.5 % (ref 11.5–14.5)
EST. GFR  (NO RACE VARIABLE): >60 ML/MIN/1.73 M^2
GLUCOSE SERPL-MCNC: 96 MG/DL (ref 70–110)
HCT VFR BLD AUTO: 52.9 % (ref 40–54)
HDLC SERPL-MCNC: 30 MG/DL (ref 40–75)
HDLC SERPL: 17.9 % (ref 20–50)
HGB BLD-MCNC: 16 G/DL (ref 14–18)
LDLC SERPL CALC-MCNC: 112 MG/DL (ref 63–159)
MCH RBC QN AUTO: 22.4 PG (ref 27–31)
MCHC RBC AUTO-ENTMCNC: 30.2 G/DL (ref 32–36)
MCV RBC AUTO: 74 FL (ref 82–98)
NONHDLC SERPL-MCNC: 138 MG/DL
PLATELET # BLD AUTO: 227 K/UL (ref 150–450)
PMV BLD AUTO: ABNORMAL FL (ref 9.2–12.9)
POTASSIUM SERPL-SCNC: 4.2 MMOL/L (ref 3.5–5.1)
PROT SERPL-MCNC: 7.5 G/DL (ref 6–8.4)
RBC # BLD AUTO: 7.14 M/UL (ref 4.6–6.2)
SODIUM SERPL-SCNC: 136 MMOL/L (ref 136–145)
TESTOST SERPL-MCNC: >1500 NG/DL (ref 304–1227)
TRIGL SERPL-MCNC: 130 MG/DL (ref 30–150)
WBC # BLD AUTO: 8.13 K/UL (ref 3.9–12.7)

## 2023-11-02 PROCEDURE — 80061 LIPID PANEL: CPT | Performed by: UROLOGY

## 2023-11-02 PROCEDURE — 85027 COMPLETE CBC AUTOMATED: CPT | Performed by: UROLOGY

## 2023-11-02 PROCEDURE — 80053 COMPREHEN METABOLIC PANEL: CPT | Performed by: UROLOGY

## 2023-11-02 PROCEDURE — 36415 COLL VENOUS BLD VENIPUNCTURE: CPT | Mod: PN | Performed by: UROLOGY

## 2023-11-02 PROCEDURE — 84153 ASSAY OF PSA TOTAL: CPT | Performed by: UROLOGY

## 2023-11-02 PROCEDURE — 84403 ASSAY OF TOTAL TESTOSTERONE: CPT | Performed by: UROLOGY

## 2023-11-07 ENCOUNTER — OFFICE VISIT (OUTPATIENT)
Dept: UROLOGY | Facility: CLINIC | Age: 58
End: 2023-11-07
Payer: COMMERCIAL

## 2023-11-07 DIAGNOSIS — N13.8 BPH WITH OBSTRUCTION/LOWER URINARY TRACT SYMPTOMS: ICD-10-CM

## 2023-11-07 DIAGNOSIS — N40.1 BPH WITH OBSTRUCTION/LOWER URINARY TRACT SYMPTOMS: ICD-10-CM

## 2023-11-07 DIAGNOSIS — N32.81 OAB (OVERACTIVE BLADDER): ICD-10-CM

## 2023-11-07 DIAGNOSIS — E34.9 TESTOSTERONE DEFICIENCY: Primary | ICD-10-CM

## 2023-11-07 LAB
BILIRUB UR QL STRIP: NEGATIVE
GLUCOSE UR QL STRIP: NEGATIVE
KETONES UR QL STRIP: NEGATIVE
LEUKOCYTE ESTERASE UR QL STRIP: NEGATIVE
PH, POC UA: 5
POC BLOOD, URINE: NEGATIVE
POC NITRATES, URINE: NEGATIVE
POC RESIDUAL URINE VOLUME: 31 ML (ref 0–100)
PROT UR QL STRIP: NEGATIVE
SP GR UR STRIP: 1.02 (ref 1–1.03)
UROBILINOGEN UR STRIP-ACNC: 0.2 (ref 0.3–2.2)

## 2023-11-07 PROCEDURE — 51798 POCT BLADDER SCAN: ICD-10-PCS | Mod: S$GLB,,, | Performed by: UROLOGY

## 2023-11-07 PROCEDURE — 51798 US URINE CAPACITY MEASURE: CPT | Mod: S$GLB,,, | Performed by: UROLOGY

## 2023-11-07 PROCEDURE — 81003 POCT URINALYSIS, DIPSTICK, AUTOMATED, W/O SCOPE: ICD-10-PCS | Mod: QW,S$GLB,, | Performed by: UROLOGY

## 2023-11-07 PROCEDURE — 99214 OFFICE O/P EST MOD 30 MIN: CPT | Mod: S$GLB,,, | Performed by: UROLOGY

## 2023-11-07 PROCEDURE — 99999 PR PBB SHADOW E&M-EST. PATIENT-LVL I: CPT | Mod: PBBFAC,,, | Performed by: UROLOGY

## 2023-11-07 PROCEDURE — 81003 URINALYSIS AUTO W/O SCOPE: CPT | Mod: QW,S$GLB,, | Performed by: UROLOGY

## 2023-11-07 PROCEDURE — 3052F HG A1C>EQUAL 8.0%<EQUAL 9.0%: CPT | Mod: CPTII,S$GLB,, | Performed by: UROLOGY

## 2023-11-07 PROCEDURE — 99999 PR PBB SHADOW E&M-EST. PATIENT-LVL I: ICD-10-PCS | Mod: PBBFAC,,, | Performed by: UROLOGY

## 2023-11-07 PROCEDURE — 99214 PR OFFICE/OUTPT VISIT, EST, LEVL IV, 30-39 MIN: ICD-10-PCS | Mod: S$GLB,,, | Performed by: UROLOGY

## 2023-11-07 PROCEDURE — 4010F ACE/ARB THERAPY RXD/TAKEN: CPT | Mod: CPTII,S$GLB,, | Performed by: UROLOGY

## 2023-11-07 PROCEDURE — 4010F PR ACE/ARB THEARPY RXD/TAKEN: ICD-10-PCS | Mod: CPTII,S$GLB,, | Performed by: UROLOGY

## 2023-11-07 PROCEDURE — 3052F PR MOST RECENT HEMOGLOBIN A1C LEVEL 8.0 - < 9.0%: ICD-10-PCS | Mod: CPTII,S$GLB,, | Performed by: UROLOGY

## 2023-11-07 RX ORDER — TESTOSTERONE CYPIONATE 200 MG/ML
200 INJECTION, SOLUTION INTRAMUSCULAR
Qty: 4 ML | Refills: 5 | Status: SHIPPED | OUTPATIENT
Start: 2023-11-07

## 2023-11-07 RX ORDER — SYRINGE, DISPOSABLE, 3 ML
SYRINGE, EMPTY DISPOSABLE MISCELLANEOUS
Qty: 12 EACH | Refills: 4 | Status: SHIPPED | OUTPATIENT
Start: 2023-11-07

## 2023-11-07 RX ORDER — FENOFIBRATE 160 MG/1
160 TABLET ORAL
COMMUNITY
Start: 2023-11-03

## 2023-11-07 RX ORDER — NEEDLES, DISPOSABLE 23GX1 1/2"
NEEDLE, DISPOSABLE MISCELLANEOUS
Qty: 12 EACH | Refills: 4 | Status: SHIPPED | OUTPATIENT
Start: 2023-11-07

## 2023-11-07 RX ORDER — NEEDLES, DISPOSABLE 25GX5/8"
NEEDLE, DISPOSABLE MISCELLANEOUS
Qty: 12 EACH | Refills: 4 | Status: SHIPPED | OUTPATIENT
Start: 2023-11-07

## 2023-11-07 RX ORDER — TIRZEPATIDE 5 MG/.5ML
INJECTION, SOLUTION SUBCUTANEOUS
COMMUNITY
Start: 2023-10-05

## 2023-11-07 RX ORDER — LEVOTHYROXINE SODIUM 25 UG/1
25 TABLET ORAL
COMMUNITY
Start: 2023-11-03

## 2023-11-07 NOTE — PROGRESS NOTES
CC: low T, BPH    History of Present Illness:     11/7/23-Pt did blood work 4 days after injection. Taking 400mg IM Q14 days. Energy is okay. Urgency is improved with myrbetriq. Taking flomax and daily cialis. No gross hematuria. No difficulty emptying. Lost weight with diet and feeling better.   5/5/23-Myrbetriq does help, but he still has some UUI. Stream is good. Energy is not great, but sometimes missing injections and then doubling up. No gross hematuria.   11/4/22- Recent testosterone level was good at 732 1 day after injection. Feels like he is having pretty good energy. Patient has been on testosterone cypionate 200mg IM Q7 days. Also on daily cialis and flomax. He continues to have UUI and changes clothing when it happens. States that stream is great.     5/3/22- Using xyosted 75mg Q7 days. Took injection 1 day prior to blood work. Can feel his level drop off toward the end. Taking trospium 20mg once daily and seems to be working well. No difficulty emptying. UUI is much improved. EtOH makes UUI worse. Continues to take flomax and cialis.  Erections are okay.   11/23/21- here for testopel. Prostate measures at 44g on ultrasound  11/19/21- here for cysto.   11/5/21-CMP and CBC done at [facility] and fairly normal. On flomax, daily cialis, sanctura and T 400mg IM Q 14 days. It's been 3 weeks since his last injection. Reports that he is gaining fat and not sure why. Eating healthy. Urgency has improved but notices his pants are a little wet. Wearing C-pap.   8/5/21- Pt is a 57yo male here for follow up.  He has testosterone deficiency and has been managed on testopel for years now, but recently switched back to IM testosterone cypionate injections due to insurance. He is also managed on flomax and daily cialis. Wears his C-pap. He reports that he is having insensate incontinence. Also has urgency, which is severe. Also having nocturnal enuresis. Good stream. Feels like he empties.   1 cup of coffee daily. Tried  "oxybutynin in the past, but can't remember why he stopped.     H/H a little elevated at 17.3/54.7. Has since donated blood.     Review of Systems   Constitutional:  Negative for activity change, appetite change, chills and fever.   Respiratory:  Negative for chest tightness and shortness of breath.    Cardiovascular:  Negative for chest pain.   Gastrointestinal:  Negative for abdominal pain.   Genitourinary:  Positive for nocturia. Negative for enuresis.   Psychiatric/Behavioral:  Negative for confusion.    All other systems reviewed and are negative.      Current Outpatient Medications   Medication Sig Dispense Refill    citalopram (CELEXA) 20 MG tablet Take 20 mg by mouth once daily.      fenofibrate 160 MG Tab Take 160 mg by mouth.      levothyroxine (SYNTHROID) 25 MCG tablet Take 25 mcg by mouth.      lisinopriL 10 MG tablet lisinopril 10 mg tabs      mirabegron (MYRBETRIQ) 50 mg Tb24 Take 1 tablet (50 mg total) by mouth once daily. 30 tablet 11    MOUNJARO 5 mg/0.5 mL PnIj INJECT 5 mg into skin EVERY 7 DAYS      needle, disp, 18 G (HYPODERMIC NEEDLES) 18 gauge x 1 1/2" Ndle Use as directed 12 each 4    needle, disp, 23 gauge (EASY TOUCH HYPODERMIC NEEDLE) 23 gauge x 1 1/2" Ndle Use as directed 12 each 4    pantoprazole (PROTONIX) 40 MG tablet Take 40 mg by mouth.      syringe, disposable, 3 mL Syrg Use as directed 12 each 4    tadalafiL (CIALIS) 5 MG tablet Take 1 tablet (5 mg total) by mouth once daily. 30 tablet 11    tamsulosin (FLOMAX) 0.4 mg Cap Take 1 capsule (0.4 mg total) by mouth every evening. 30 capsule 11    testosterone cypionate (DEPOTESTOTERONE CYPIONATE) 200 mg/mL injection Inject 1 mL (200 mg total) into the muscle every 7 days. 4 mL 5     No current facility-administered medications for this visit.       Review of patient's allergies indicates:  No Known Allergies    Past medical, family, and social history reviewed as documented in chart with pertinent positive medical, family, and social " history detailed in HPI.    Physical Exam  There were no vitals filed for this visit.      General: Well-developed, well-nourished, in no acute distress  HEENT: Normocephalic, atraumatic, extraocular eye movements in tact  Neck: supple, trachea midline, no cervical or supraclavicular adenopathy  Respirations: Even and unlabored  Abdomen: soft, Non-tender, Non-distended, no palpable masses, no rebound or guarding  Rectal: 11/7/23- 40gram prostate without nodules or tenderness. No gross blood.   Extremities: Moves all extremities equally, atraumatic, no clubbing, cyanosis, edema  Skin: warm and dry, no lesions  Psych: normal affect  Neuro: Alert and oriented x 3. Cranial nerves II-XII grossly intact    PVR: 39cc 8/5/21      PSA  4/26/22: 0.72  11/1/22: 0.58  5/1/23: 0.39  11/2/23: 0.67    Lab Results   Component Value Date    ALT 27 11/02/2023    AST 17 11/02/2023    ALKPHOS 33 (L) 11/02/2023    BILITOT 0.3 11/02/2023     BMP  Lab Results   Component Value Date     11/02/2023    K 4.2 11/02/2023     11/02/2023    CO2 26 11/02/2023    BUN 13 11/02/2023    CREATININE 1.0 11/02/2023    CALCIUM 9.9 11/02/2023    ANIONGAP 8 11/02/2023    EGFRNORACEVR >60.0 11/02/2023     Lab Results   Component Value Date    WBC 8.13 11/02/2023    HGB 16.0 11/02/2023    HCT 52.9 11/02/2023    MCV 74 (L) 11/02/2023     11/02/2023           Testosterone, Total   Date/Time Value Ref Range Status   11/02/2023 09:19 AM >1500 (H) 304 - 1227 ng/dL Final         Assessment:   1. Testosterone deficiency  Comprehensive Metabolic Panel    CBC Without Differential    Testosterone      2. BPH with obstruction/lower urinary tract symptoms  Prostate Specific Antigen, Diagnostic      3. OAB (overactive bladder)                    Plan:  Testosterone deficiency  -     Comprehensive Metabolic Panel; Future; Expected date: 05/02/2024  -     CBC Without Differential; Future; Expected date: 05/02/2024  -     Testosterone; Future; Expected  "date: 05/02/2024    BPH with obstruction/lower urinary tract symptoms  -     Prostate Specific Antigen, Diagnostic; Future; Expected date: 05/02/2024    OAB (overactive bladder)    Other orders  -     testosterone cypionate (DEPOTESTOTERONE CYPIONATE) 200 mg/mL injection; Inject 1 mL (200 mg total) into the muscle every 7 days.  Dispense: 4 mL; Refill: 5  -     needle, disp, 18 G (HYPODERMIC NEEDLES) 18 gauge x 1 1/2" Ndle; Use as directed  Dispense: 12 each; Refill: 4  -     needle, disp, 23 gauge (EASY TOUCH HYPODERMIC NEEDLE) 23 gauge x 1 1/2" Ndle; Use as directed  Dispense: 12 each; Refill: 4  -     syringe, disposable, 3 mL Syrg; Use as directed  Dispense: 12 each; Refill: 4          Continue PRN phelbotomy  Continue flomax, myrbetriq and cialis    Follow up in about 6 months (around 5/7/2024) for labs before visit.                    "

## 2024-05-06 ENCOUNTER — LAB VISIT (OUTPATIENT)
Dept: LAB | Facility: HOSPITAL | Age: 59
End: 2024-05-06
Attending: UROLOGY
Payer: COMMERCIAL

## 2024-05-06 DIAGNOSIS — N40.1 BPH WITH OBSTRUCTION/LOWER URINARY TRACT SYMPTOMS: ICD-10-CM

## 2024-05-06 DIAGNOSIS — N13.8 BPH WITH OBSTRUCTION/LOWER URINARY TRACT SYMPTOMS: ICD-10-CM

## 2024-05-06 DIAGNOSIS — E34.9 TESTOSTERONE DEFICIENCY: ICD-10-CM

## 2024-05-06 LAB
ALBUMIN SERPL BCP-MCNC: 3.9 G/DL (ref 3.5–5.2)
ALP SERPL-CCNC: 30 U/L (ref 55–135)
ALT SERPL W/O P-5'-P-CCNC: 18 U/L (ref 10–44)
ANION GAP SERPL CALC-SCNC: 8 MMOL/L (ref 8–16)
AST SERPL-CCNC: 16 U/L (ref 10–40)
BILIRUB SERPL-MCNC: 0.3 MG/DL (ref 0.1–1)
BUN SERPL-MCNC: 15 MG/DL (ref 6–20)
CALCIUM SERPL-MCNC: 9.9 MG/DL (ref 8.7–10.5)
CHLORIDE SERPL-SCNC: 105 MMOL/L (ref 95–110)
CO2 SERPL-SCNC: 24 MMOL/L (ref 23–29)
COMPLEXED PSA SERPL-MCNC: 0.73 NG/ML (ref 0–4)
CREAT SERPL-MCNC: 1 MG/DL (ref 0.5–1.4)
ERYTHROCYTE [DISTWIDTH] IN BLOOD BY AUTOMATED COUNT: 21.3 % (ref 11.5–14.5)
EST. GFR  (NO RACE VARIABLE): >60 ML/MIN/1.73 M^2
GLUCOSE SERPL-MCNC: 94 MG/DL (ref 70–110)
HCT VFR BLD AUTO: 46.3 % (ref 40–54)
HGB BLD-MCNC: 13.9 G/DL (ref 14–18)
MCH RBC QN AUTO: 21.2 PG (ref 27–31)
MCHC RBC AUTO-ENTMCNC: 30 G/DL (ref 32–36)
MCV RBC AUTO: 71 FL (ref 82–98)
PLATELET # BLD AUTO: 257 K/UL (ref 150–450)
PMV BLD AUTO: ABNORMAL FL (ref 9.2–12.9)
POTASSIUM SERPL-SCNC: 4.2 MMOL/L (ref 3.5–5.1)
PROT SERPL-MCNC: 7.4 G/DL (ref 6–8.4)
RBC # BLD AUTO: 6.55 M/UL (ref 4.6–6.2)
SODIUM SERPL-SCNC: 137 MMOL/L (ref 136–145)
TESTOST SERPL-MCNC: 236 NG/DL (ref 304–1227)
WBC # BLD AUTO: 9.26 K/UL (ref 3.9–12.7)

## 2024-05-06 PROCEDURE — 80053 COMPREHEN METABOLIC PANEL: CPT | Performed by: UROLOGY

## 2024-05-06 PROCEDURE — 36415 COLL VENOUS BLD VENIPUNCTURE: CPT | Mod: PN | Performed by: UROLOGY

## 2024-05-06 PROCEDURE — 85027 COMPLETE CBC AUTOMATED: CPT | Performed by: UROLOGY

## 2024-05-06 PROCEDURE — 84403 ASSAY OF TOTAL TESTOSTERONE: CPT | Performed by: UROLOGY

## 2024-05-06 PROCEDURE — 84153 ASSAY OF PSA TOTAL: CPT | Performed by: UROLOGY

## 2024-06-13 RX ORDER — NEEDLES, DISPOSABLE 25GX5/8"
NEEDLE, DISPOSABLE MISCELLANEOUS
Qty: 12 EACH | Refills: 4 | Status: SHIPPED | OUTPATIENT
Start: 2024-06-13

## 2024-06-13 RX ORDER — NEEDLES, DISPOSABLE 23GX1 1/2"
NEEDLE, DISPOSABLE MISCELLANEOUS
Qty: 12 EACH | Refills: 4 | Status: SHIPPED | OUTPATIENT
Start: 2024-06-13

## 2024-06-13 RX ORDER — TESTOSTERONE CYPIONATE 200 MG/ML
200 INJECTION, SOLUTION INTRAMUSCULAR
Qty: 4 ML | Refills: 1 | Status: SHIPPED | OUTPATIENT
Start: 2024-06-13

## 2024-06-13 NOTE — TELEPHONE ENCOUNTER
"----- Message from Danika Joy sent at 6/13/2024  9:18 AM CDT -----  Regarding: new rx  Type:  RX Refill Request    Who Called: Tre  Refill or New Rx:new refill  RX Name and Strength: Disp Refills Start End ALFIE  testosterone cypionate (DEPOTESTOTERONE CYPIONATE) 200 mg/mL injection,  needle, disp, 23 gauge (EASY TOUCH HYPODERMIC NEEDLE) 23 gauge x 1 1/2" Ndle, and needle, disp, 18 G (HYPODERMIC NEEDLES) 18 gauge x 1 1/2" Ndle         How is the patient currently taking it? (ex. 1XDay):  Is this a 30 day or 90 day RX:  Preferred Pharmacy with phone number:[facility], [place] [address]   Phone: [phone]  Fax: [phone]        Local or Mail Order:local  Ordering Provider:  Would the patient rather a call back or a response via [facility]? Call back  Best Call Back Number: [phone]  Additional Information: pt is completely out  " Daily Note     Today's date: 10/30/2023  Patient name: Eliane Lopez  : 1965  MRN: [id number]  Referring provider: Duncan Hart PA-C  Dx:   Encounter Diagnosis     ICD-10-CM    1. Other closed displaced fracture of proximal end of right humerus with routine healing, subsequent encounter  S42.291D       2. Chronic right shoulder pain  M25.511     G89.29       3. Muscle weakness of right upper extremity  M62.81                      Subjective: patient reports that she tried driving in parking lot and had no issues with her shoulder. She feels comfortable driving to work tomorrow. She has been consistent with home program and notes less tightness with her exercises. Objective: See treatment diary below      Assessment: Tolerated treatment well. Patient demonstrated good tolerance with exercise progression including strengthening exercise with no pain noted. She had adequate ROM to perform safe driving. Advised patient that I see no restrictions from PT standpoint in regards to driving. Patient would benefit from continued skilled PT per plan of care to address impairments and improve function. Plan: Continue per plan of care. Precautions: AROM, PROM, gradual return to strengthening.  EPOC 12/15/23    Manuals 10/18 10/23 10/25 10/30         Shld PROM with jt stabilization  NICOLE NICOLE NICOLE                      STM UT/infraspinatus  NICOLE NICOLE                       Neuro Re-Ed                          Scap retraction  10x 10x          Median nerve flossing  nv 10x 15x         Median nerve stretch   10x 15x         Shld ext with retraction    15x gtb         Row with retraction    15x bltb         Ther Ex             Table slide flexion 10x5" 10x5"           Table slide abd  10x5"           Wall circles   10 ea 10 ea         Wand flexion AAROM  10x5" 10x5" 10x         Pt edu on plan of care, pathology, home program 5'  3'          Wand ER AROM 10x5" 10x5" 10x5" 10x10"         IR PROM behind back   10x5" 5x10" Pendulums all directions  10 ea 10 ea          R UT stretch  5x10" 5x10"          Pulley flexion  10x5" 10x5" 10x5"         Pulley abd   10x5" 10x5"         Cross body adduction stretch 10x5" 10x5" 10x5" 10x5"         UBE for ROM/strength   2'/2' lvl 1 2'/2' lvl 3                                                Ther Activity                                       Gait Training                                       Modalities             Cold pack end 8' 8' 5' 5'

## 2024-07-30 ENCOUNTER — OFFICE VISIT (OUTPATIENT)
Dept: UROLOGY | Facility: CLINIC | Age: 59
End: 2024-07-30
Payer: COMMERCIAL

## 2024-07-30 ENCOUNTER — TELEPHONE (OUTPATIENT)
Dept: UROLOGY | Facility: CLINIC | Age: 59
End: 2024-07-30

## 2024-07-30 VITALS
HEIGHT: 67 IN | BODY MASS INDEX: 35.5 KG/M2 | DIASTOLIC BLOOD PRESSURE: 78 MMHG | HEART RATE: 97 BPM | SYSTOLIC BLOOD PRESSURE: 131 MMHG | RESPIRATION RATE: 18 BRPM | WEIGHT: 226.19 LBS

## 2024-07-30 DIAGNOSIS — Z01.818 PRE-OP TESTING: ICD-10-CM

## 2024-07-30 DIAGNOSIS — N40.1 BPH WITH OBSTRUCTION/LOWER URINARY TRACT SYMPTOMS: Primary | ICD-10-CM

## 2024-07-30 DIAGNOSIS — G47.33 OSA (OBSTRUCTIVE SLEEP APNEA): ICD-10-CM

## 2024-07-30 DIAGNOSIS — R39.15 URINARY URGENCY: ICD-10-CM

## 2024-07-30 DIAGNOSIS — N13.8 BPH WITH OBSTRUCTION/LOWER URINARY TRACT SYMPTOMS: Primary | ICD-10-CM

## 2024-07-30 DIAGNOSIS — E34.9 TESTOSTERONE DEFICIENCY: ICD-10-CM

## 2024-07-30 LAB
BILIRUB UR QL STRIP: NEGATIVE
GLUCOSE UR QL STRIP: NEGATIVE
KETONES UR QL STRIP: NEGATIVE
LEUKOCYTE ESTERASE UR QL STRIP: NEGATIVE
PH, POC UA: 5.5
POC BLOOD, URINE: NEGATIVE
POC NITRATES, URINE: NEGATIVE
PROT UR QL STRIP: NEGATIVE
SP GR UR STRIP: 1.02 (ref 1–1.03)
UROBILINOGEN UR STRIP-ACNC: 0.2 (ref 0.3–2.2)

## 2024-07-30 PROCEDURE — 1159F MED LIST DOCD IN RCRD: CPT | Mod: CPTII,S$GLB,, | Performed by: UROLOGY

## 2024-07-30 PROCEDURE — 99999 PR PBB SHADOW E&M-EST. PATIENT-LVL V: CPT | Mod: PBBFAC,,, | Performed by: UROLOGY

## 2024-07-30 PROCEDURE — 81003 URINALYSIS AUTO W/O SCOPE: CPT | Mod: QW,S$GLB,, | Performed by: UROLOGY

## 2024-07-30 PROCEDURE — 3075F SYST BP GE 130 - 139MM HG: CPT | Mod: CPTII,S$GLB,, | Performed by: UROLOGY

## 2024-07-30 PROCEDURE — 4010F ACE/ARB THERAPY RXD/TAKEN: CPT | Mod: CPTII,S$GLB,, | Performed by: UROLOGY

## 2024-07-30 PROCEDURE — 99214 OFFICE O/P EST MOD 30 MIN: CPT | Mod: S$GLB,,, | Performed by: UROLOGY

## 2024-07-30 PROCEDURE — 3008F BODY MASS INDEX DOCD: CPT | Mod: CPTII,S$GLB,, | Performed by: UROLOGY

## 2024-07-30 PROCEDURE — 3044F HG A1C LEVEL LT 7.0%: CPT | Mod: CPTII,S$GLB,, | Performed by: UROLOGY

## 2024-07-30 PROCEDURE — 3078F DIAST BP <80 MM HG: CPT | Mod: CPTII,S$GLB,, | Performed by: UROLOGY

## 2024-07-30 RX ORDER — TADALAFIL 5 MG/1
5 TABLET ORAL DAILY
Qty: 30 TABLET | Refills: 11 | Status: SHIPPED | OUTPATIENT
Start: 2024-07-30

## 2024-07-30 RX ORDER — MIRABEGRON 50 MG/1
50 TABLET, EXTENDED RELEASE ORAL DAILY
Qty: 30 TABLET | Refills: 11 | Status: SHIPPED | OUTPATIENT
Start: 2024-07-30 | End: 2025-07-30

## 2024-07-30 RX ORDER — TESTOSTERONE CYPIONATE 200 MG/ML
200 INJECTION, SOLUTION INTRAMUSCULAR
Qty: 4 ML | Refills: 5 | Status: SHIPPED | OUTPATIENT
Start: 2024-07-30

## 2024-07-30 RX ORDER — CIPROFLOXACIN 2 MG/ML
400 INJECTION, SOLUTION INTRAVENOUS
OUTPATIENT
Start: 2024-07-30

## 2024-07-30 NOTE — TELEPHONE ENCOUNTER
.Outgoing call placed to patient, patient verified name and date of birth, patient notified of Dr. Drew ordering US and patient verbalized understanding and chose appropriate date and time, patient also notified regarding his pre op testing and scheduled as requested per patient. Patient also stated he will come by the clinic next Wednesday to get copy of his appointments, surgery instructions and sign consent. No further assistance needed.

## 2024-07-30 NOTE — PROGRESS NOTES
CC: low T, BPH    History of Present Illness:     7/30/24-Pt on daily cialis, myrbetriq and testosterone cyp 200mg Q7 days. States that he had missed an injection prior to his lab work. He did another sleep study yesterday due to chronic fatigue. Getting a new machine. Still having UUI 2-3 days a week.     11/7/23-Pt did blood work 4 days after injection. Taking 400mg IM Q14 days. Energy is okay. Urgency is improved with myrbetriq. Taking flomax and daily cialis. No gross hematuria. No difficulty emptying. Lost weight with diet and feeling better.   5/5/23-Myrbetriq does help, but he still has some UUI. Stream is good. Energy is not great, but sometimes missing injections and then doubling up. No gross hematuria.   11/4/22- Recent testosterone level was good at 732 1 day after injection. Feels like he is having pretty good energy. Patient has been on testosterone cypionate 200mg IM Q7 days. Also on daily cialis and flomax. He continues to have UUI and changes clothing when it happens. States that stream is great.     5/3/22- Using xyosted 75mg Q7 days. Took injection 1 day prior to blood work. Can feel his level drop off toward the end. Taking trospium 20mg once daily and seems to be working well. No difficulty emptying. UUI is much improved. EtOH makes UUI worse. Continues to take flomax and cialis.  Erections are okay.   11/23/21- here for testopel. Prostate measures at 44g on ultrasound  11/19/21- here for cysto.   11/5/21-CMP and CBC done at [facility] and fairly normal. On flomax, daily cialis, sanctura and T 400mg IM Q 14 days. It's been 3 weeks since his last injection. Reports that he is gaining fat and not sure why. Eating healthy. Urgency has improved but notices his pants are a little wet. Wearing C-pap.   8/5/21- Pt is a 57yo male here for follow up.  He has testosterone deficiency and has been managed on testopel for years now, but recently switched back to IM testosterone cypionate injections due to  "insurance. He is also managed on flomax and daily cialis. Wears his C-pap. He reports that he is having insensate incontinence. Also has urgency, which is severe. Also having nocturnal enuresis. Good stream. Feels like he empties.   1 cup of coffee daily. Tried oxybutynin in the past, but can't remember why he stopped.     H/H a little elevated at 17.3/54.7. Has since donated blood.     Review of Systems   Constitutional:  Negative for activity change, appetite change, chills and fever.   Respiratory:  Negative for chest tightness and shortness of breath.    Cardiovascular:  Negative for chest pain.   Gastrointestinal:  Negative for abdominal pain.   Genitourinary:  Positive for nocturia. Negative for enuresis.   Psychiatric/Behavioral:  Negative for confusion.    All other systems reviewed and are negative.      Current Outpatient Medications   Medication Sig Dispense Refill    citalopram (CELEXA) 20 MG tablet Take 20 mg by mouth once daily.      fenofibrate 160 MG Tab Take 160 mg by mouth.      levothyroxine (SYNTHROID) 25 MCG tablet Take 25 mcg by mouth.      lisinopriL 10 MG tablet lisinopril 10 mg tabs      MOUNJARO 5 mg/0.5 mL PnIj INJECT 5 mg into skin EVERY 7 DAYS      needle, disp, 18 G (HYPODERMIC NEEDLES) 18 gauge x 1 1/2" Ndle Use as directed 12 each 4    needle, disp, 23 gauge (EASY TOUCH HYPODERMIC NEEDLE) 23 gauge x 1 1/2" Ndle Use as directed 12 each 4    syringe, disposable, 3 mL Syrg Use as directed 12 each 4    tamsulosin (FLOMAX) 0.4 mg Cap Take 1 capsule (0.4 mg total) by mouth every evening. 30 capsule 11    mirabegron (MYRBETRIQ) 50 mg Tb24 Take 1 tablet (50 mg total) by mouth once daily. 30 tablet 11    pantoprazole (PROTONIX) 40 MG tablet Take 40 mg by mouth.      tadalafiL (CIALIS) 5 MG tablet Take 1 tablet (5 mg total) by mouth once daily. 30 tablet 11    testosterone cypionate (DEPOTESTOTERONE CYPIONATE) 200 mg/mL injection Inject 1 mL (200 mg total) into the muscle every 7 days. 4 mL 5 "     No current facility-administered medications for this visit.       Review of patient's allergies indicates:  No Known Allergies    Past medical, family, and social history reviewed as documented in chart with pertinent positive medical, family, and social history detailed in HPI.    Physical Exam  Vitals:    07/30/24 1412   BP: 131/78   Pulse: 97   Resp: 18         General: Well-developed, well-nourished, in no acute distress  HEENT: Normocephalic, atraumatic, extraocular eye movements in tact  Neck: supple, trachea midline, no cervical or supraclavicular adenopathy  Rectal: 11/7/23- 40gram prostate without nodules or tenderness. No gross blood.   Extremities: Moves all extremities equally, atraumatic, no clubbing, cyanosis, edema  Skin: warm and dry, no lesions  Psych: normal affect  Neuro: Alert and oriented x 3. Cranial nerves II-XII grossly intact    PVR: 39cc 8/5/21    UA: negative for blood, LE, nit      PSA  4/26/22: 0.72  11/1/22: 0.58  5/1/23: 0.39  11/2/23: 0.67  5/6/24: 0.73    Lab Results   Component Value Date    ALT 18 05/06/2024    AST 16 05/06/2024    ALKPHOS 30 (L) 05/06/2024    BILITOT 0.3 05/06/2024     BMP  Lab Results   Component Value Date     05/06/2024    K 4.2 05/06/2024     05/06/2024    CO2 24 05/06/2024    BUN 15 05/06/2024    CREATININE 1.0 05/06/2024    CALCIUM 9.9 05/06/2024    ANIONGAP 8 05/06/2024    EGFRNORACEVR >60.0 05/06/2024     Lab Results   Component Value Date    WBC 9.26 05/06/2024    HGB 13.9 (L) 05/06/2024    HCT 46.3 05/06/2024    MCV 71 (L) 05/06/2024     05/06/2024           Testosterone, Total   Date/Time Value Ref Range Status   05/06/2024 09:08  (L) 304 - 1227 ng/dL Final         Assessment:   1. BPH with obstruction/lower urinary tract symptoms  US Pelvis Complete Non OB    Place in Outpatient - Extended Recovery    Case Request Operating Room: TURP (TRANSURETHRAL RESECTION OF PROSTATE)    Vital Signs     Diet NPO    Place sequential  compression device      2. Testosterone deficiency  POCT Urinalysis, Dipstick, Automated, W/O Scope      3. RICK (obstructive sleep apnea)        4. Pre-op testing  Ambulatory referral/consult to Pre-Admit    CBC Auto Differential    Comprehensive Metabolic Panel    X-Ray Chest PA And Lateral Pre-OP    EKG 12-lead    CULTURE, URINE    CANCELED: CULTURE, URINE      5. Urinary urgency                    Plan:  BPH with obstruction/lower urinary tract symptoms  -     US Pelvis Complete Non OB; Future; Expected date: 07/30/2024  -     Place in Outpatient - Extended Recovery; Standing  -     Case Request Operating Room: TURP (TRANSURETHRAL RESECTION OF PROSTATE)  -     Vital Signs ; Standing  -     Diet NPO; Standing  -     Place sequential compression device; Standing    Testosterone deficiency  -     POCT Urinalysis, Dipstick, Automated, W/O Scope    RICK (obstructive sleep apnea)    Pre-op testing  -     Ambulatory referral/consult to Pre-Admit; Future; Expected date: 08/06/2024  -     CBC Auto Differential; Future; Expected date: 07/30/2024  -     Comprehensive Metabolic Panel; Future; Expected date: 07/30/2024  -     X-Ray Chest PA And Lateral Pre-OP; Future; Expected date: 07/30/2024  -     EKG 12-lead; Future  -     Cancel: CULTURE, URINE  -     CULTURE, URINE; Future; Expected date: 11/21/2024    Urinary urgency    Other orders  -     IP VTE LOW RISK PATIENT; Standing  -     ciprofloxacin (CIPRO)400mg/200ml D5W IVPB 400 mg  -     tadalafiL (CIALIS) 5 MG tablet; Take 1 tablet (5 mg total) by mouth once daily.  Dispense: 30 tablet; Refill: 11  -     testosterone cypionate (DEPOTESTOTERONE CYPIONATE) 200 mg/mL injection; Inject 1 mL (200 mg total) into the muscle every 7 days.  Dispense: 4 mL; Refill: 5  -     mirabegron (MYRBETRIQ) 50 mg Tb24; Take 1 tablet (50 mg total) by mouth once daily.  Dispense: 30 tablet; Refill: 11        Pt interested in surgical management. Discussed the utility of urodynamic studies prior  to surgical management, but pt doesn't want to. Discussed TURP vs UroLift. Pt states that he is okay with either. Will plan for TURP 12/5/24.   Continue myrbetriq and cialis

## 2024-08-06 ENCOUNTER — HOSPITAL ENCOUNTER (OUTPATIENT)
Dept: RADIOLOGY | Facility: HOSPITAL | Age: 59
Discharge: HOME | End: 2024-08-06
Attending: UROLOGY
Payer: COMMERCIAL

## 2024-08-06 DIAGNOSIS — N40.1 BPH WITH OBSTRUCTION/LOWER URINARY TRACT SYMPTOMS: ICD-10-CM

## 2024-08-06 DIAGNOSIS — N13.8 BPH WITH OBSTRUCTION/LOWER URINARY TRACT SYMPTOMS: ICD-10-CM

## 2024-08-06 PROCEDURE — 76856 US EXAM PELVIC COMPLETE: CPT | Mod: 26,,, | Performed by: RADIOLOGY

## 2024-08-06 PROCEDURE — 76856 US EXAM PELVIC COMPLETE: CPT | Mod: TC,PN

## 2024-08-14 ENCOUNTER — TELEPHONE (OUTPATIENT)
Dept: CARDIOLOGY | Facility: HOSPITAL | Age: 59
End: 2024-08-14
Payer: COMMERCIAL

## 2024-11-19 ENCOUNTER — PATIENT MESSAGE (OUTPATIENT)
Dept: NEUROLOGY | Facility: CLINIC | Age: 59
End: 2024-11-19
Payer: COMMERCIAL

## 2024-11-20 ENCOUNTER — HOSPITAL ENCOUNTER (OUTPATIENT)
Dept: RADIOLOGY | Facility: HOSPITAL | Age: 59
Discharge: HOME | End: 2024-11-20
Attending: UROLOGY
Payer: COMMERCIAL

## 2024-11-20 ENCOUNTER — HOSPITAL ENCOUNTER (OUTPATIENT)
Dept: CARDIOLOGY | Facility: HOSPITAL | Age: 59
Discharge: HOME | End: 2024-11-20
Attending: UROLOGY
Payer: COMMERCIAL

## 2024-11-20 DIAGNOSIS — Z01.818 PRE-OP TESTING: ICD-10-CM

## 2024-11-20 PROCEDURE — 71046 X-RAY EXAM CHEST 2 VIEWS: CPT | Mod: TC,FY,PO

## 2024-11-20 PROCEDURE — 71046 X-RAY EXAM CHEST 2 VIEWS: CPT | Mod: 26,,, | Performed by: RADIOLOGY

## 2024-11-20 PROCEDURE — 93005 ELECTROCARDIOGRAM TRACING: CPT | Mod: PO

## 2024-11-20 PROCEDURE — 93010 ELECTROCARDIOGRAM REPORT: CPT | Mod: ,,, | Performed by: INTERNAL MEDICINE

## 2024-11-22 ENCOUNTER — PATIENT MESSAGE (OUTPATIENT)
Dept: PREADMISSION TESTING | Facility: HOSPITAL | Age: 59
End: 2024-11-22
Payer: COMMERCIAL

## 2024-11-22 LAB
OHS QRS DURATION: 114 MS
OHS QTC CALCULATION: 409 MS

## 2024-12-03 NOTE — PRE-PROCEDURE INSTRUCTIONS
Pre op instructions reviewed with pt over telephone, verbalized understanding.    Procedure Date: 12/5/24  Arrival Time:  TBD; We will call you after 2pm the day before your procedure with your arrival time.    Address:   [facility] (Off [facility], 2nd Building on the left)  [id number] [place] [address] [place]. [address]  >>>Please enter through front entrance Lobby of 1st floor to Registration desk<<<        !!!INSTRUCTIONS IMPORTANT!!!  NO FOOD or tobacco products after midnight the night before surgery! You may have clear liquids up to 3 hrs before your arrival to the Hospital  Clear liquids include Gatorade, water, soda, black coffee or tea (no milk or creamer), and clear juices.  Clear liquids do NOT include anything with pulp or food particles (Chicken broth, ice cream, yogurt, Jello, etc.)    >>>MEDICATION INSTRUCTIONS<<<: Morning of Surgery, take small sip of water with ONLY these medications:  Protonix     *Diabetic/ Prediabetic Patients: !!!If you take diabetic or weight loss medication, Do NOT take morning of surgery unless instructed by Doctor!!!  Metformin to be stopped 24 hrs prior to surgery.   Long Acting Insulin Instructions: HOLD the night before surgery unless instructed differently by Provider!  Ozempic/ Mounjaro/ Wegovy/ Trulicity/ Semaglutide injections or weight loss medication to be stopped 7 days prior to surgery.    !!!STOP ALL Aspirins, NSAIDS, WEIGHT LOSS INJECTIONS/PILLS, Herbal supplements, & Vitamins 7 DAYS BEFORE SURGERY!!!    ____  Avoid Alcoholic beverages 3 days prior to surgery, as it can thin the blood.  ____  NO Acrylic/fake nails or nail polish worn day of surgery (specifically hand/arm & foot surgeries).  ____  NO powder, lotions, deodorants, oils or cream on body.  ____  Remove all jewelry & piercings & foreign objects before arrival & leave at home.  ____  Remove Dentures, Hearing Aids & Contact Lens prior to surgery.  ____  Bring photo ID and insurance information  to hospital (Leave Valuables at Home).  ____  If going home the same day, arrange for a ride home. You will not be able to drive for 24 hrs if Anesthesia was used.   ____  Females (ages 11-60): may need to give a urine sample the morning of surgery; please see Pre op Nurse prior to using the restroom.  ____  Males: Stop ED medications (Viagra, Cialis) 24 hrs prior to surgery.  ____  Wear clean, loose fitting clothing to allow for dressings/ bandages.      Bathing Instructions:    -Shower with anti-bacterial Soap (Hibiclens or Dial) the night before surgery and the morning of.   -Do not use Hibiclens on your face or genitals.   -Apply clean clothes after shower.  -Do not shave your face or body 2 days prior to surgery unless instructed otherwise by your Surgeon.  -Do not shave pubic hair 7 days prior to surgery (gyn pt's).    [facility] Visitor/Ride Policy:  Only 2 adults allowed in pre op/recovery area during your procedure. You MUST HAVE A RIDE HOME from a responsible adult that you know and trust. Medical Transport, Uber or Lyft can ONLY be used if patient has a responsible adult to accompany them during ride home.       *Signs and symptoms of Infection Before or After Surgery:               !!!If you experience any fever, chills, nausea/ vomiting, foul odor/ excessive drainage from surgical site, flu-like symptoms, new wounds or cuts, PLEASE CALL THE SURGEON OFFICE at [phone] or SEND MESSAGE THROUGH [facility] PORTAL!!!     *If you are running late the morning of surgery, please call the Hospital Surgery Dept @ [phone].     *Billing questions:  [phone]     Thank you,  -[facility] Pre Admit Dept.  [phone] or [phone]  M-F 7:30 am-4:00 pm (Closed Major Holidays)

## 2024-12-04 ENCOUNTER — TELEPHONE (OUTPATIENT)
Dept: UROLOGY | Facility: CLINIC | Age: 59
End: 2024-12-04
Payer: COMMERCIAL

## 2024-12-04 DIAGNOSIS — N13.8 BPH WITH OBSTRUCTION/LOWER URINARY TRACT SYMPTOMS: Primary | ICD-10-CM

## 2024-12-04 DIAGNOSIS — N40.1 BPH WITH OBSTRUCTION/LOWER URINARY TRACT SYMPTOMS: Primary | ICD-10-CM

## 2024-12-04 DIAGNOSIS — Z01.818 PRE-OP TESTING: ICD-10-CM

## 2024-12-04 NOTE — TELEPHONE ENCOUNTER
.Outgoing call placed to patient, patient verified name and date of birth, called patient to inquire if he wanted to come into the office to sign his surgery consent before his surgery on tomorrow, patient stated he could and this nurse asked patient if he had any questions regarding the pre-operative instructions, patient verbalized he followed the instructions and this nurse asked patient if he was taking any Ozempic/Mounjaro/Wegovy/Trulicity/Semaglutide or any other weight-loss/diabetic injection, patient stated he took his Mounjaro last Thursday, notified Dr. Drew who recommended it may be best to reschedule procedure so he won't be at an increased risk for aspiration, patient verbalized understanding and notified of next availability for his procedure, patient agreed to 1/9/25 MD maribeth notified and OR notified. Pre op appts scheduled per patient request and nurse visit to sign consent and review pre op instructions as needed.

## 2024-12-27 ENCOUNTER — LAB VISIT (OUTPATIENT)
Dept: LAB | Facility: HOSPITAL | Age: 59
End: 2024-12-27
Attending: UROLOGY
Payer: COMMERCIAL

## 2024-12-27 ENCOUNTER — CLINICAL SUPPORT (OUTPATIENT)
Dept: UROLOGY | Facility: CLINIC | Age: 59
End: 2024-12-27
Payer: COMMERCIAL

## 2024-12-27 DIAGNOSIS — Z01.818 PRE-OP TESTING: Primary | ICD-10-CM

## 2024-12-27 DIAGNOSIS — Z01.818 PRE-OP TESTING: ICD-10-CM

## 2024-12-27 PROCEDURE — 87086 URINE CULTURE/COLONY COUNT: CPT | Performed by: UROLOGY

## 2024-12-28 LAB — BACTERIA UR CULT: NO GROWTH

## 2025-01-08 ENCOUNTER — TELEPHONE (OUTPATIENT)
Dept: PREADMISSION TESTING | Facility: HOSPITAL | Age: 60
End: 2025-01-08
Payer: COMMERCIAL

## 2025-01-09 ENCOUNTER — ANESTHESIA EVENT (OUTPATIENT)
Dept: SURGERY | Facility: HOSPITAL | Age: 60
End: 2025-01-09
Payer: COMMERCIAL

## 2025-01-09 ENCOUNTER — HOSPITAL ENCOUNTER (OUTPATIENT)
Facility: HOSPITAL | Age: 60
Discharge: HOME | End: 2025-01-10
Attending: UROLOGY | Admitting: UROLOGY
Payer: COMMERCIAL

## 2025-01-09 ENCOUNTER — ANESTHESIA (OUTPATIENT)
Dept: SURGERY | Facility: HOSPITAL | Age: 60
End: 2025-01-09
Payer: COMMERCIAL

## 2025-01-09 DIAGNOSIS — N13.8 BPH WITH OBSTRUCTION/LOWER URINARY TRACT SYMPTOMS: Primary | ICD-10-CM

## 2025-01-09 DIAGNOSIS — N40.1 BPH WITH OBSTRUCTION/LOWER URINARY TRACT SYMPTOMS: Primary | ICD-10-CM

## 2025-01-09 PROCEDURE — 63600175 PHARM REV CODE 636 W HCPCS: Mod: TB | Performed by: ANESTHESIOLOGY

## 2025-01-09 PROCEDURE — 63600175 PHARM REV CODE 636 W HCPCS: Performed by: NURSE ANESTHETIST, CERTIFIED REGISTERED

## 2025-01-09 PROCEDURE — 25000003 PHARM REV CODE 250: Performed by: NURSE ANESTHETIST, CERTIFIED REGISTERED

## 2025-01-09 PROCEDURE — 88305 TISSUE EXAM BY PATHOLOGIST: CPT | Mod: 26,,, | Performed by: STUDENT IN AN ORGANIZED HEALTH CARE EDUCATION/TRAINING PROGRAM

## 2025-01-09 PROCEDURE — 71000033 HC RECOVERY, INTIAL HOUR: Performed by: UROLOGY

## 2025-01-09 PROCEDURE — 36000706: Performed by: UROLOGY

## 2025-01-09 PROCEDURE — 52601 PROSTATECTOMY (TURP): CPT | Mod: ,,, | Performed by: UROLOGY

## 2025-01-09 PROCEDURE — 25000242 PHARM REV CODE 250 ALT 637 W/ HCPCS: Performed by: UROLOGY

## 2025-01-09 PROCEDURE — 25000003 PHARM REV CODE 250: Performed by: UROLOGY

## 2025-01-09 PROCEDURE — 37000008 HC ANESTHESIA 1ST 15 MINUTES: Performed by: UROLOGY

## 2025-01-09 PROCEDURE — 37000009 HC ANESTHESIA EA ADD 15 MINS: Performed by: UROLOGY

## 2025-01-09 PROCEDURE — 27201423 OPTIME MED/SURG SUP & DEVICES STERILE SUPPLY: Performed by: UROLOGY

## 2025-01-09 PROCEDURE — 63600175 PHARM REV CODE 636 W HCPCS: Performed by: UROLOGY

## 2025-01-09 PROCEDURE — 36000707: Performed by: UROLOGY

## 2025-01-09 PROCEDURE — 71000039 HC RECOVERY, EACH ADD'L HOUR: Performed by: UROLOGY

## 2025-01-09 PROCEDURE — 88305 TISSUE EXAM BY PATHOLOGIST: CPT | Performed by: STUDENT IN AN ORGANIZED HEALTH CARE EDUCATION/TRAINING PROGRAM

## 2025-01-09 RX ORDER — ONDANSETRON HYDROCHLORIDE 2 MG/ML
4 INJECTION, SOLUTION INTRAVENOUS ONCE AS NEEDED
Status: DISCONTINUED | OUTPATIENT
Start: 2025-01-09 | End: 2025-01-09 | Stop reason: HOSPADM

## 2025-01-09 RX ORDER — ACETAMINOPHEN 325 MG/1
650 TABLET ORAL EVERY 4 HOURS PRN
Status: DISCONTINUED | OUTPATIENT
Start: 2025-01-09 | End: 2025-01-10 | Stop reason: HOSPADM

## 2025-01-09 RX ORDER — FENTANYL CITRATE 50 UG/ML
INJECTION, SOLUTION INTRAMUSCULAR; INTRAVENOUS
Status: DISCONTINUED | OUTPATIENT
Start: 2025-01-09 | End: 2025-01-09

## 2025-01-09 RX ORDER — PHENYLEPHRINE HYDROCHLORIDE 10 MG/ML
INJECTION INTRAVENOUS
Status: DISCONTINUED | OUTPATIENT
Start: 2025-01-09 | End: 2025-01-09

## 2025-01-09 RX ORDER — LEVOTHYROXINE SODIUM 25 UG/1
25 TABLET ORAL
Status: DISCONTINUED | OUTPATIENT
Start: 2025-01-10 | End: 2025-01-10 | Stop reason: HOSPADM

## 2025-01-09 RX ORDER — CITALOPRAM 20 MG/1
20 TABLET, FILM COATED ORAL DAILY
Status: DISCONTINUED | OUTPATIENT
Start: 2025-01-09 | End: 2025-01-10 | Stop reason: HOSPADM

## 2025-01-09 RX ORDER — EPHEDRINE SULFATE 50 MG/ML
INJECTION, SOLUTION INTRAVENOUS
Status: DISCONTINUED | OUTPATIENT
Start: 2025-01-09 | End: 2025-01-09

## 2025-01-09 RX ORDER — ONDANSETRON HYDROCHLORIDE 2 MG/ML
4 INJECTION, SOLUTION INTRAVENOUS DAILY PRN
Status: DISCONTINUED | OUTPATIENT
Start: 2025-01-09 | End: 2025-01-09 | Stop reason: HOSPADM

## 2025-01-09 RX ORDER — HYOSCYAMINE SULFATE 0.12 MG/1
0.25 TABLET SUBLINGUAL EVERY 4 HOURS PRN
Status: DISCONTINUED | OUTPATIENT
Start: 2025-01-09 | End: 2025-01-10 | Stop reason: HOSPADM

## 2025-01-09 RX ORDER — FENOFIBRATE 160 MG/1
160 TABLET ORAL DAILY
Status: DISCONTINUED | OUTPATIENT
Start: 2025-01-09 | End: 2025-01-10 | Stop reason: HOSPADM

## 2025-01-09 RX ORDER — CIPROFLOXACIN 2 MG/ML
400 INJECTION, SOLUTION INTRAVENOUS
Status: COMPLETED | OUTPATIENT
Start: 2025-01-09 | End: 2025-01-09

## 2025-01-09 RX ORDER — PANTOPRAZOLE SODIUM 40 MG/1
40 TABLET, DELAYED RELEASE ORAL DAILY
Status: DISCONTINUED | OUTPATIENT
Start: 2025-01-09 | End: 2025-01-10 | Stop reason: HOSPADM

## 2025-01-09 RX ORDER — ONDANSETRON HYDROCHLORIDE 2 MG/ML
4 INJECTION, SOLUTION INTRAVENOUS EVERY 8 HOURS PRN
Status: DISCONTINUED | OUTPATIENT
Start: 2025-01-09 | End: 2025-01-10 | Stop reason: HOSPADM

## 2025-01-09 RX ORDER — PROPOFOL 10 MG/ML
VIAL (ML) INTRAVENOUS
Status: DISCONTINUED | OUTPATIENT
Start: 2025-01-09 | End: 2025-01-09

## 2025-01-09 RX ORDER — MEPERIDINE HYDROCHLORIDE 50 MG/ML
12.5 INJECTION INTRAMUSCULAR; INTRAVENOUS; SUBCUTANEOUS ONCE AS NEEDED
Status: DISCONTINUED | OUTPATIENT
Start: 2025-01-09 | End: 2025-01-09 | Stop reason: HOSPADM

## 2025-01-09 RX ORDER — FENTANYL CITRATE 50 UG/ML
25 INJECTION, SOLUTION INTRAMUSCULAR; INTRAVENOUS EVERY 5 MIN PRN
Status: DISCONTINUED | OUTPATIENT
Start: 2025-01-09 | End: 2025-01-09 | Stop reason: HOSPADM

## 2025-01-09 RX ORDER — LISINOPRIL 10 MG/1
10 TABLET ORAL DAILY
Status: DISCONTINUED | OUTPATIENT
Start: 2025-01-10 | End: 2025-01-10 | Stop reason: HOSPADM

## 2025-01-09 RX ORDER — HYDROCODONE BITARTRATE AND ACETAMINOPHEN 5; 325 MG/1; MG/1
1 TABLET ORAL EVERY 4 HOURS PRN
Status: DISCONTINUED | OUTPATIENT
Start: 2025-01-09 | End: 2025-01-10 | Stop reason: HOSPADM

## 2025-01-09 RX ORDER — LIDOCAINE HYDROCHLORIDE 20 MG/ML
INJECTION INTRAVENOUS
Status: DISCONTINUED | OUTPATIENT
Start: 2025-01-09 | End: 2025-01-09

## 2025-01-09 RX ORDER — ONDANSETRON HYDROCHLORIDE 2 MG/ML
INJECTION, SOLUTION INTRAVENOUS
Status: DISCONTINUED | OUTPATIENT
Start: 2025-01-09 | End: 2025-01-09

## 2025-01-09 RX ORDER — OXYCODONE AND ACETAMINOPHEN 5; 325 MG/1; MG/1
1 TABLET ORAL
Status: DISCONTINUED | OUTPATIENT
Start: 2025-01-09 | End: 2025-01-09 | Stop reason: HOSPADM

## 2025-01-09 RX ORDER — HYDROMORPHONE HYDROCHLORIDE 1 MG/ML
0.2 INJECTION, SOLUTION INTRAMUSCULAR; INTRAVENOUS; SUBCUTANEOUS EVERY 5 MIN PRN
Status: DISCONTINUED | OUTPATIENT
Start: 2025-01-09 | End: 2025-01-09 | Stop reason: HOSPADM

## 2025-01-09 RX ADMIN — FENTANYL CITRATE 50 MCG: 50 INJECTION, SOLUTION INTRAMUSCULAR; INTRAVENOUS at 08:01

## 2025-01-09 RX ADMIN — HYDROCODONE BITARTRATE AND ACETAMINOPHEN 1 TABLET: 5; 325 TABLET ORAL at 10:01

## 2025-01-09 RX ADMIN — PHENYLEPHRINE HYDROCHLORIDE 200 MCG: 10 INJECTION INTRAVENOUS at 09:01

## 2025-01-09 RX ADMIN — EPHEDRINE SULFATE 10 MG: 50 INJECTION INTRAVENOUS at 09:01

## 2025-01-09 RX ADMIN — CITALOPRAM HYDROBROMIDE 20 MG: 20 TABLET ORAL at 04:01

## 2025-01-09 RX ADMIN — PROPOFOL 180 MG: 10 INJECTION, EMULSION INTRAVENOUS at 08:01

## 2025-01-09 RX ADMIN — PHENYLEPHRINE HYDROCHLORIDE 100 MCG: 10 INJECTION INTRAVENOUS at 09:01

## 2025-01-09 RX ADMIN — LIDOCAINE HYDROCHLORIDE 50 MG: 20 INJECTION INTRAVENOUS at 08:01

## 2025-01-09 RX ADMIN — HYDROCODONE BITARTRATE AND ACETAMINOPHEN 1 TABLET: 5; 325 TABLET ORAL at 04:01

## 2025-01-09 RX ADMIN — HYDROMORPHONE HYDROCHLORIDE 0.2 MG: 1 INJECTION, SOLUTION INTRAMUSCULAR; INTRAVENOUS; SUBCUTANEOUS at 10:01

## 2025-01-09 RX ADMIN — SODIUM CHLORIDE, SODIUM LACTATE, POTASSIUM CHLORIDE, AND CALCIUM CHLORIDE: .6; .31; .03; .02 INJECTION, SOLUTION INTRAVENOUS at 08:01

## 2025-01-09 RX ADMIN — HYOSCYAMINE SULFATE 0.25 MG: 0.12 TABLET SUBLINGUAL at 10:01

## 2025-01-09 RX ADMIN — CIPROFLOXACIN 400 MG: 2 INJECTION, SOLUTION INTRAVENOUS at 09:01

## 2025-01-09 RX ADMIN — PANTOPRAZOLE SODIUM 40 MG: 40 TABLET, DELAYED RELEASE ORAL at 04:01

## 2025-01-09 RX ADMIN — ONDANSETRON 4 MG: 2 INJECTION INTRAMUSCULAR; INTRAVENOUS at 09:01

## 2025-01-09 RX ADMIN — FENOFIBRATE 160 MG: 160 TABLET ORAL at 04:01

## 2025-01-09 RX ADMIN — ONDANSETRON 4 MG: 2 INJECTION INTRAMUSCULAR; INTRAVENOUS at 01:01

## 2025-01-09 NOTE — OP NOTE
Date of Procedure: 01/09/2025    PREOP DIAGNOSIS:  Benign prostatic hypertrophy.    POSTOP DIAGNOSIS:  Benign prostatic hypertrophy.    PROCEDURES:      1.  Transurethral resection of the prostate    SURGEON:  Doris Drew MD    Assistants: None    Specimen:  Prostate chips    ANESTHESIA:  General endotracheal.    BLOOD LOSS:  5cc    FINDINGS:  Clear channel at the conclusion.      PROCEDURE IN DETAIL:  Patient was brought to the operative suite and placed under general anesthesia and positioned into the dorsal lithotomy position.  After being sterilely prepped and draped, I advanced the cystoscope into his bladder and examined the bladder, noting trabeculation present. The cystoscope was removed and a 24 Portuguese resectoscope was inserted into a normal urethra.  Prostatic urethra demonstrated a high bladder neck and lateral lobe hypertrophy.  Bladder neck was otherwise unremarkable.  Bilateral ureteral orifices were in normal size, shape, caliber and location.   We then began resection with the bipolar loop, slowly dissecting down the bladder neck.  We then continued the resection more distally, first on the right side and then on the left, being careful not to resect distal to the verumontanum.  Ureteral orifices were well away from resection plane.  The scope was removed and an 18 Portuguese 3 Melo catheter was placed without difficulty, 18 mL sterile water was placed in the balloon and sat nicely upon the bladder neck.  Patient was taken to the PACU in stable condition.      COMPLICATIONS:  None

## 2025-01-09 NOTE — ANESTHESIA PROCEDURE NOTES
Intubation    Date/Time: 1/9/2025 9:00 AM    Performed by: Sarabjit Pena CRNA  Authorized by: Ronald Spangler MD    Intubation:     Induction:  Intravenous    Intubated:  Postinduction    Mask Ventilation:  Easy mask    Attempts:  1    Attempted By:  CRNA    Difficult Airway Encountered?: No      Complications:  None    Airway Device:  Supraglottic airway/LMA    Airway Device Size:  4.0    Style/Cuff Inflation:  Cuffed    Placement Verified By:  Capnometry    Complicating Factors:  None    Findings Post-Intubation:  BS equal bilateral

## 2025-01-09 NOTE — ANESTHESIA PREPROCEDURE EVALUATION
01/09/2025  Tre Khalil is a 59 y.o., male.      Pre-op Assessment    I have reviewed the Patient Summary Reports.     I have reviewed the Nursing Notes. I have reviewed the NPO Status.      Review of Systems  Anesthesia Hx:  No problems with previous Anesthesia                Hematology/Oncology:  Hematology Normal   Oncology Normal                                   Renal/:  Renal/ Normal                 Hepatic/GI:  Hepatic/GI Normal                    Neurological:  Neurology Normal                                      Endocrine:  Endocrine Normal            Dermatological:  Skin Normal    Psych:  Psychiatric Normal                    Physical Exam  General: Well nourished, Cooperative, Alert and Oriented    Airway:  Mallampati: II / II  Mouth Opening: Normal  TM Distance: Normal  Tongue: Normal  Neck ROM: Normal ROM    Dental:  Intact      Patient Active Problem List   Diagnosis    Hypogonadism in male    RICK (obstructive sleep apnea)    OAB (overactive bladder)    BPH with obstruction/lower urinary tract symptoms         Anesthesia Plan  Type of Anesthesia, risks & benefits discussed:    Anesthesia Type: Gen ETT, Gen Supraglottic Airway  Intra-op Monitoring Plan: Standard ASA Monitors  Post Op Pain Control Plan: multimodal analgesia  Induction:  IV  Airway Plan: Direct  Informed Consent: Informed consent signed with the Patient and all parties understand the risks and agree with anesthesia plan.  All questions answered.   ASA Score: 2  Day of Surgery Review of History & Physical: H&P Update referred to the surgeon/provider.I have interviewed and examined the patient. I have reviewed the patient's H&P dated: There are no significant changes. H&P completed by Anesthesiologist.    Ready For Surgery From Anesthesia Perspective.     .

## 2025-01-09 NOTE — TRANSFER OF CARE
"Anesthesia Transfer of Care Note    Patient: Tre Khalil    Procedure(s) Performed: Procedure(s) (LRB):  TURP (TRANSURETHRAL RESECTION OF PROSTATE) (N/A)    Patient location: PACU    Anesthesia Type: general    Transport from OR: Transported from OR on room air with adequate spontaneous ventilation    Post pain: adequate analgesia    Post assessment: no apparent anesthetic complications    Post vital signs: stable    Level of consciousness: responds to stimulation    Nausea/Vomiting: no nausea/vomiting    Complications: none    Transfer of care protocol was followed      Last vitals: Visit Vitals  BP (!) 156/86 (BP Location: Right arm, Patient Position: Sitting)   Pulse 73   Temp 37.1 °C (98.8 °F) (Temporal)   Resp 16   Ht 5' 7" (1.702 m)   Wt 102.4 kg (225 lb 13.8 oz)   SpO2 95%   BMI 35.37 kg/m²     "

## 2025-01-09 NOTE — ANESTHESIA POSTPROCEDURE EVALUATION
Anesthesia Post Evaluation    Patient: Tre Khalil    Procedure(s) Performed: Procedure(s) (LRB):  TURP (TRANSURETHRAL RESECTION OF PROSTATE) (N/A)    Final Anesthesia Type: general      Patient location during evaluation: PACU  Patient participation: Yes- Able to Participate  Level of consciousness: awake and alert, oriented and awake  Post-procedure vital signs: reviewed and stable  Pain management: adequate  Airway patency: patent    PONV status at discharge: No PONV  Anesthetic complications: no      Cardiovascular status: blood pressure returned to baseline  Respiratory status: unassisted  Hydration status: euvolemic  Follow-up not needed.              Vitals Value Taken Time   /64 01/09/25 1026   Temp 36.7 °C (98 °F) 01/09/25 1005   Pulse 81 01/09/25 1027   Resp 5 01/09/25 1027   SpO2 96 % 01/09/25 1027   Vitals shown include unfiled device data.      No case tracking events are documented in the log.      Pain/Maggi Score: Maggi Score: 4 (1/9/2025 10:05 AM)

## 2025-01-09 NOTE — H&P
CC: BPH    History of Present Illness:     1/9/25-here for TURP. No interval change in H&P.     7/30/24-Pt on daily cialis, myrbetriq and testosterone cyp 200mg Q7 days. States that he had missed an injection prior to his lab work. He did another sleep study yesterday due to chronic fatigue. Getting a new machine. Still having UUI 2-3 days a week.     11/7/23-Pt did blood work 4 days after injection. Taking 400mg IM Q14 days. Energy is okay. Urgency is improved with myrbetriq. Taking flomax and daily cialis. No gross hematuria. No difficulty emptying. Lost weight with diet and feeling better.   5/5/23-Myrbetriq does help, but he still has some UUI. Stream is good. Energy is not great, but sometimes missing injections and then doubling up. No gross hematuria.   11/4/22- Recent testosterone level was good at 732 1 day after injection. Feels like he is having pretty good energy. Patient has been on testosterone cypionate 200mg IM Q7 days. Also on daily cialis and flomax. He continues to have UUI and changes clothing when it happens. States that stream is great.     5/3/22- Using xyosted 75mg Q7 days. Took injection 1 day prior to blood work. Can feel his level drop off toward the end. Taking trospium 20mg once daily and seems to be working well. No difficulty emptying. UUI is much improved. EtOH makes UUI worse. Continues to take flomax and cialis.  Erections are okay.   11/23/21- here for testopel. Prostate measures at 44g on ultrasound  11/19/21- here for cysto.   11/5/21-CMP and CBC done at [facility] and fairly normal. On flomax, daily cialis, sanctura and T 400mg IM Q 14 days. It's been 3 weeks since his last injection. Reports that he is gaining fat and not sure why. Eating healthy. Urgency has improved but notices his pants are a little wet. Wearing C-pap.   8/5/21- Pt is a 57yo male here for follow up.  He has testosterone deficiency and has been managed on testopel for years now, but recently switched back to IM  testosterone cypionate injections due to insurance. He is also managed on flomax and daily cialis. Wears his C-pap. He reports that he is having insensate incontinence. Also has urgency, which is severe. Also having nocturnal enuresis. Good stream. Feels like he empties.   1 cup of coffee daily. Tried oxybutynin in the past, but can't remember why he stopped.     H/H a little elevated at 17.3/54.7. Has since donated blood.     Review of Systems   Constitutional:  Negative for activity change, appetite change, chills and fever.   Respiratory:  Negative for chest tightness and shortness of breath.    Cardiovascular:  Negative for chest pain.   Gastrointestinal:  Negative for abdominal pain.   Genitourinary:  Positive for nocturia. Negative for enuresis.   Psychiatric/Behavioral:  Negative for confusion.    All other systems reviewed and are negative.      No current facility-administered medications for this encounter.       Review of patient's allergies indicates:  No Known Allergies    Past medical, family, and social history reviewed as documented in chart with pertinent positive medical, family, and social history detailed in HPI.    Physical Exam  There were no vitals filed for this visit.        General: Well-developed, well-nourished, in no acute distress  HEENT: Normocephalic, atraumatic, extraocular eye movements in tact  Neck: supple, trachea midline, no cervical or supraclavicular adenopathy  Rectal: 11/7/23- 40gram prostate without nodules or tenderness. No gross blood.   Extremities: Moves all extremities equally, atraumatic, no clubbing, cyanosis, edema  Skin: warm and dry, no lesions  Psych: normal affect  Neuro: Alert and oriented x 3. Cranial nerves II-XII grossly intact    PVR: 39cc 8/5/21    UA: negative for blood, LE, nit      PSA  4/26/22: 0.72  11/1/22: 0.58  5/1/23: 0.39  11/2/23: 0.67  5/6/24: 0.73    Lab Results   Component Value Date    ALT 21 12/27/2024    AST 18 12/27/2024    ALKPHOS 34 (L)  12/27/2024    BILITOT 0.3 12/27/2024     BMP  Lab Results   Component Value Date     12/27/2024    K 4.0 12/27/2024     12/27/2024    CO2 26 12/27/2024    BUN 14 12/27/2024    CREATININE 0.9 12/27/2024    CALCIUM 9.6 12/27/2024    ANIONGAP 6 (L) 12/27/2024    EGFRNORACEVR >60.0 12/27/2024     Lab Results   Component Value Date    WBC 8.79 12/27/2024    HGB 12.8 (L) 12/27/2024    HCT 44.9 12/27/2024    MCV 67 (L) 12/27/2024     12/27/2024           Testosterone, Total   Date/Time Value Ref Range Status   05/06/2024 09:08  (L) 304 - 1227 ng/dL Final         Assessment:   1. BPH with obstruction/lower urinary tract symptoms  US Pelvis Complete Non OB    Place in Outpatient - Extended Recovery    Case Request Operating Room: TURP (TRANSURETHRAL RESECTION OF PROSTATE)    Vital Signs     Diet NPO    Place sequential compression device      2. Testosterone deficiency  POCT Urinalysis, Dipstick, Automated, W/O Scope      3. RICK (obstructive sleep apnea)        4. Pre-op testing  Ambulatory referral/consult to Pre-Admit    CBC Auto Differential    Comprehensive Metabolic Panel    X-Ray Chest PA And Lateral Pre-OP    EKG 12-lead    CULTURE, URINE    CANCELED: CULTURE, URINE      5. Urinary urgency                    Plan:  BPH with obstruction      Risks/benefits discussed. Will proceed with TURP today.

## 2025-01-09 NOTE — PLAN OF CARE
Discussed poc with pt, pt verbalized understanding    Purposeful rounding every 2hours  Fall precautions in place, remains injury free  Pain under control with PRN meds  Abx given as prescribed  Bed locked at lowest position  Call light within reach    Chart check complete  Will cont with POC

## 2025-01-10 ENCOUNTER — TELEPHONE (OUTPATIENT)
Dept: UROLOGY | Facility: CLINIC | Age: 60
End: 2025-01-10
Payer: COMMERCIAL

## 2025-01-10 VITALS
HEIGHT: 67 IN | OXYGEN SATURATION: 94 % | TEMPERATURE: 98 F | BODY MASS INDEX: 36.92 KG/M2 | RESPIRATION RATE: 20 BRPM | HEART RATE: 73 BPM | DIASTOLIC BLOOD PRESSURE: 63 MMHG | WEIGHT: 235.25 LBS | SYSTOLIC BLOOD PRESSURE: 134 MMHG

## 2025-01-10 LAB
ANION GAP SERPL CALC-SCNC: 8 MMOL/L (ref 8–16)
BASOPHILS # BLD AUTO: 0.06 K/UL (ref 0–0.2)
BASOPHILS NFR BLD: 0.7 % (ref 0–1.9)
BUN SERPL-MCNC: 13 MG/DL (ref 6–20)
CALCIUM SERPL-MCNC: 8.9 MG/DL (ref 8.7–10.5)
CHLORIDE SERPL-SCNC: 105 MMOL/L (ref 95–110)
CO2 SERPL-SCNC: 26 MMOL/L (ref 23–29)
CREAT SERPL-MCNC: 1.1 MG/DL (ref 0.5–1.4)
DIFFERENTIAL METHOD BLD: ABNORMAL
EOSINOPHIL # BLD AUTO: 0.2 K/UL (ref 0–0.5)
EOSINOPHIL NFR BLD: 2.2 % (ref 0–8)
ERYTHROCYTE [DISTWIDTH] IN BLOOD BY AUTOMATED COUNT: 23.2 % (ref 11.5–14.5)
EST. GFR  (NO RACE VARIABLE): >60 ML/MIN/1.73 M^2
FINAL PATHOLOGIC DIAGNOSIS: NORMAL
GLUCOSE SERPL-MCNC: 146 MG/DL (ref 70–110)
GROSS: NORMAL
HCT VFR BLD AUTO: 43.3 % (ref 40–54)
HGB BLD-MCNC: 12 G/DL (ref 14–18)
IMM GRANULOCYTES # BLD AUTO: 0.02 K/UL (ref 0–0.04)
IMM GRANULOCYTES NFR BLD AUTO: 0.2 % (ref 0–0.5)
LYMPHOCYTES # BLD AUTO: 2.1 K/UL (ref 1–4.8)
LYMPHOCYTES NFR BLD: 23.9 % (ref 18–48)
Lab: NORMAL
MCH RBC QN AUTO: 18.8 PG (ref 27–31)
MCHC RBC AUTO-ENTMCNC: 27.7 G/DL (ref 32–36)
MCV RBC AUTO: 68 FL (ref 82–98)
MONOCYTES # BLD AUTO: 0.8 K/UL (ref 0.3–1)
MONOCYTES NFR BLD: 8.9 % (ref 4–15)
NEUTROPHILS # BLD AUTO: 5.7 K/UL (ref 1.8–7.7)
NEUTROPHILS NFR BLD: 64.1 % (ref 38–73)
NRBC BLD-RTO: 0 /100 WBC
PLATELET # BLD AUTO: 166 K/UL (ref 150–450)
PMV BLD AUTO: ABNORMAL FL (ref 9.2–12.9)
POCT GLUCOSE: 131 MG/DL (ref 70–110)
POTASSIUM SERPL-SCNC: 4.5 MMOL/L (ref 3.5–5.1)
RBC # BLD AUTO: 6.38 M/UL (ref 4.6–6.2)
SODIUM SERPL-SCNC: 139 MMOL/L (ref 136–145)
WBC # BLD AUTO: 8.9 K/UL (ref 3.9–12.7)

## 2025-01-10 PROCEDURE — 85025 COMPLETE CBC W/AUTO DIFF WBC: CPT | Performed by: UROLOGY

## 2025-01-10 PROCEDURE — 94799 UNLISTED PULMONARY SVC/PX: CPT

## 2025-01-10 PROCEDURE — 36415 COLL VENOUS BLD VENIPUNCTURE: CPT | Performed by: UROLOGY

## 2025-01-10 PROCEDURE — 80048 BASIC METABOLIC PNL TOTAL CA: CPT | Performed by: UROLOGY

## 2025-01-10 PROCEDURE — 25000003 PHARM REV CODE 250: Performed by: UROLOGY

## 2025-01-10 PROCEDURE — 25000242 PHARM REV CODE 250 ALT 637 W/ HCPCS: Performed by: UROLOGY

## 2025-01-10 RX ORDER — HYDROCODONE BITARTRATE AND ACETAMINOPHEN 5; 325 MG/1; MG/1
1 TABLET ORAL EVERY 6 HOURS PRN
Qty: 10 TABLET | Refills: 0 | Status: SHIPPED | OUTPATIENT
Start: 2025-01-10

## 2025-01-10 RX ORDER — PHENAZOPYRIDINE HYDROCHLORIDE 200 MG/1
200 TABLET, FILM COATED ORAL 3 TIMES DAILY PRN
Qty: 15 TABLET | Refills: 0 | Status: SHIPPED | OUTPATIENT
Start: 2025-01-10 | End: 2025-01-20

## 2025-01-10 RX ADMIN — LEVOTHYROXINE SODIUM 25 MCG: 25 TABLET ORAL at 06:01

## 2025-01-10 RX ADMIN — CITALOPRAM HYDROBROMIDE 20 MG: 20 TABLET ORAL at 09:01

## 2025-01-10 RX ADMIN — PANTOPRAZOLE SODIUM 40 MG: 40 TABLET, DELAYED RELEASE ORAL at 09:01

## 2025-01-10 RX ADMIN — LISINOPRIL 10 MG: 10 TABLET ORAL at 09:01

## 2025-01-10 RX ADMIN — FENOFIBRATE 160 MG: 160 TABLET ORAL at 09:01

## 2025-01-10 NOTE — DISCHARGE SUMMARY
[facility]  Urology  Discharge Summary      Patient Name: Tre Khalil  MRN: [id number]  Admission Date: 1/9/2025  Hospital Length of Stay: 0 days  Discharge Date and Time:  01/10/2025 9:19 AM  Attending Physician: Doris Drew MD  Discharging Provider: Doris Drew MD  Primary Care Physician: TORI Bush MD    HPI:   No notes on file    Procedure(s) (LRB):  TURP (TRANSURETHRAL RESECTION OF PROSTATE) (N/A)     Indwelling Lines/Drains at time of discharge:   Lines/Drains/Airways       Drain  Duration                  Urethral Catheter 01/09/25 0934 Latex;Triple-lumen 18 Fr. <1 day                    Hospital Course (synopsis of major diagnoses, care, treatment, and services provided during the course of the hospital stay): 60yo male presented through day surgery for TURP. Post-op, he was admitted for CBI. On POD#1, his CBI was clamped and his urine remained clear. He was deemed appropriate for discharge.            Consults: none    Significant Diagnostic Studies: stable post-op labs    Pending Diagnostic Studies:       Procedure Component Value Units Date/Time    Specimen to Pathology, Surgery Urology [[id number]] Collected: 01/09/25 0950    Order Status: Sent Lab Status: In process Updated: 01/09/25 1141    Specimen: Tissue             Final Active Diagnoses:    Diagnosis Date Noted POA    PRINCIPAL PROBLEM:  BPH with obstruction/lower urinary tract symptoms [N40.1, N13.8] 05/03/2022 Yes      Problems Resolved During this Admission:         Discharged Condition: stable    Disposition: Home or Self Care    Follow Up:   Follow-up Information       Doris Drew MD Follow up in 3 week(s).    Specialty: Urology  Contact information:  [address] [place] [address]  [phone]                           Patient Instructions:      Diet Adult Regular     Other restrictions (specify):   Order Comments: No vigorous activity x 4 weeks.     Notify your health care provider if you  "experience any of the following:  temperature >100.4     Notify your health care provider if you experience any of the following:  persistent nausea and vomiting or diarrhea     Notify your health care provider if you experience any of the following:  severe uncontrolled pain     No dressing needed     Medications:  Reconciled Home Medications:      Medication List        START taking these medications      HYDROcodone-acetaminophen 5-325 mg per tablet  Commonly known as: NORCO  Take 1 tablet by mouth every 6 (six) hours as needed for Pain.     phenazopyridine 200 MG tablet  Commonly known as: PYRIDIUM  Take 1 tablet (200 mg total) by mouth 3 (three) times daily as needed.            CONTINUE taking these medications      citalopram 20 MG tablet  Commonly known as: CeleXA  Take 20 mg by mouth once daily.     fenofibrate 160 MG Tab  Take 160 mg by mouth.     * HYPODERMIC NEEDLES 18 gauge x 1 1/2" Ndle  Generic drug: needle (disp) 18 G  Use as directed     * EASY TOUCH HYPODERMIC NEEDLE 23 gauge x 1 1/2" Ndle  Generic drug: needle (disp) 23 gauge  Use as directed     lisinopriL 10 MG tablet  lisinopril 10 mg tabs     mirabegron 50 mg Tb24  Commonly known as: MYRBETRIQ  Take 1 tablet (50 mg total) by mouth once daily.     MOUNJARO 5 mg/0.5 mL Pnij  Generic drug: tirzepatide  INJECT 5 mg into skin EVERY 7 DAYS     pantoprazole 40 MG tablet  Commonly known as: PROTONIX  Take 40 mg by mouth.     syringe (disposable) 3 mL Syrg  Use as directed     tadalafiL 5 MG tablet  Commonly known as: CIALIS  Take 1 tablet (5 mg total) by mouth once daily.     testosterone cypionate 200 mg/mL injection  Commonly known as: DEPOTESTOTERONE CYPIONATE  Inject 1 mL (200 mg total) into the muscle every 7 days.           * This list has 2 medication(s) that are the same as other medications prescribed for you. Read the directions carefully, and ask your doctor or other care provider to review them with you.                ASK your doctor " about these medications      levothyroxine 25 MCG tablet  Commonly known as: SYNTHROID  Take 25 mcg by mouth.     tamsulosin 0.4 mg Cap  Commonly known as: FLOMAX  Take 1 capsule (0.4 mg total) by mouth every evening.              Time spent on the discharge of patient: 20 minutes    Doris Drew MD  Urology  [facility] [facility]

## 2025-01-10 NOTE — PLAN OF CARE
[facility]  Discharge Final Note    Primary Care Provider: TORI Bush MD    Expected Discharge Date: 1/10/2025    Final Discharge Note (most recent)       Final Note - 01/10/25 0929          Final Note    Assessment Type Final Discharge Note     Anticipated Discharge Disposition Home or Self Care        Post-Acute Status    Discharge Delays None known at this time                     Contact Info       Doris Drew MD   Specialty: Urology    [address] [place] [address]   Phone: [phone]       Next Steps: Follow up in 3 week(s)          Patient has no d/c needs at this time. Sw to follow up, as needed, for d/c planning purposes.

## 2025-01-10 NOTE — TELEPHONE ENCOUNTER
.Outgoing call placed to patient, patient verified name and date of birth, patient stated he is doing well, notified of below, verbalized understanding and appointment scheduled.       ----- Message from Doris Drew MD sent at 1/10/2025  9:22 AM CST -----  Please schedule a 3 weeks post-op

## 2025-01-10 NOTE — PLAN OF CARE
Per Urology MD patient adequate for discharge since montalvo removed. Patient have had adequate urination output.

## 2025-01-10 NOTE — NURSING
PT CBI clamped at 0500 per order. 400 ml of pink tinged urine drained from montalvo bag. Will continue with POC.

## 2025-01-10 NOTE — PLAN OF CARE
Discussed poc with pt, pt verbalized understanding  Purposeful rounding every 2hours  VS wnl  Fall precautions in place, remains injury free  Pain and nausea under control with PRN meds  IVFs  Accurate I&Os  Bed locked at lowest position  Call light within reach  Chart check complete  Will cont with POC

## 2025-01-16 ENCOUNTER — TELEPHONE (OUTPATIENT)
Dept: UROLOGY | Facility: CLINIC | Age: 60
End: 2025-01-16
Payer: COMMERCIAL

## 2025-01-16 ENCOUNTER — LAB VISIT (OUTPATIENT)
Dept: LAB | Facility: HOSPITAL | Age: 60
End: 2025-01-16
Attending: UROLOGY
Payer: COMMERCIAL

## 2025-01-16 DIAGNOSIS — N40.1 BPH WITH OBSTRUCTION/LOWER URINARY TRACT SYMPTOMS: ICD-10-CM

## 2025-01-16 DIAGNOSIS — N40.1 BPH WITH OBSTRUCTION/LOWER URINARY TRACT SYMPTOMS: Primary | ICD-10-CM

## 2025-01-16 DIAGNOSIS — N13.8 BPH WITH OBSTRUCTION/LOWER URINARY TRACT SYMPTOMS: ICD-10-CM

## 2025-01-16 DIAGNOSIS — N13.8 BPH WITH OBSTRUCTION/LOWER URINARY TRACT SYMPTOMS: Primary | ICD-10-CM

## 2025-01-16 LAB
BACTERIA #/AREA URNS AUTO: ABNORMAL /HPF
BILIRUB UR QL STRIP: NEGATIVE
CLARITY UR REFRACT.AUTO: CLEAR
COLOR UR AUTO: YELLOW
GLUCOSE UR QL STRIP: NEGATIVE
HGB UR QL STRIP: ABNORMAL
HYALINE CASTS UR QL AUTO: 0 /LPF
KETONES UR QL STRIP: NEGATIVE
LEUKOCYTE ESTERASE UR QL STRIP: NEGATIVE
MICROSCOPIC COMMENT: ABNORMAL
NITRITE UR QL STRIP: POSITIVE
PH UR STRIP: 7 [PH] (ref 5–8)
PROT UR QL STRIP: ABNORMAL
RBC #/AREA URNS AUTO: >100 /HPF (ref 0–4)
SP GR UR STRIP: 1.02 (ref 1–1.03)
URN SPEC COLLECT METH UR: ABNORMAL
WBC #/AREA URNS AUTO: 1 /HPF (ref 0–5)

## 2025-01-16 PROCEDURE — 87086 URINE CULTURE/COLONY COUNT: CPT | Performed by: UROLOGY

## 2025-01-16 PROCEDURE — 81001 URINALYSIS AUTO W/SCOPE: CPT | Performed by: UROLOGY

## 2025-01-16 NOTE — TELEPHONE ENCOUNTER
Spoke with patient who was able to provide acceptable patient identifiers prior to start of conversation. Patient states he's experiencing pain within the penile area and pain after urination with some urinary frequency since surgery on 01/09/2025. Will nortify Dr. Drew reference to patient concern. Patient verbalized understanding.

## 2025-01-16 NOTE — TELEPHONE ENCOUNTER
Please have him do a UA and culture. Also, see if he feels that he needs refills of any of the meds he was discharged with.

## 2025-01-16 NOTE — TELEPHONE ENCOUNTER
----- Message from Tia sent at 1/16/2025 10:42 AM CST -----  Who Called:Pt    What is the request in detail: Requesting call back to discuss pain is worse now, after surgery. Please advise    Can the clinic reply by [facility]? No    Best Call Back Number: [phone]      Additional Information:

## 2025-01-17 ENCOUNTER — TELEPHONE (OUTPATIENT)
Dept: UROLOGY | Facility: CLINIC | Age: 60
End: 2025-01-17
Payer: COMMERCIAL

## 2025-01-17 NOTE — TELEPHONE ENCOUNTER
Spoke with patient who was able to provide acceptable patient identifiers prior to start of conversation. Patient notified that I have spoken with pharmacy who will be sending PA request to be initiated. Patient notified that this process could take up to 48-72 hours. Patient verbalized understanding.

## 2025-01-17 NOTE — TELEPHONE ENCOUNTER
.Outgoing call placed to patient, patient verified name and date of birth, patient notified that I spoke with several people with his insurance before speaking with the last rep who stated patient's insurance with [employer] needs to be updated and was terminated on 10/2024. Patient stated he just went on and paid for the medication and will contact them in the future if he has to.

## 2025-01-17 NOTE — TELEPHONE ENCOUNTER
----- Message from TherOx sent at 1/17/2025  7:47 AM CST -----  Contact: self  ..Type:  Needs Medical Advice    Who Called: .Tre Khalil  Symptoms (please be specific):    How long has patient had these symptoms:    Pharmacy name and phone #:  ..  [facility] - [place], [place] - [address] [address]  [address] [address] [address]  Phone: [phone] Fax: [phone]  Would the patient rather a call back or a response via [facility]? Call back  Best Call Back Number: .[phone]  Additional Information: Pt is requesting that the pharmacy be contacted due to them needing prior authorization before filling medication   methen-m.blue-s.phos-phsal-hyo (URIBEL) 118-10-40.8-36 mg Cap

## 2025-01-18 LAB — BACTERIA UR CULT: NO GROWTH

## 2025-01-23 NOTE — TELEPHONE ENCOUNTER
"     Intraoperative Pathology Consultation     I,Fabiano oNlan MD, was called to the \"IR suite\" to consult on the kidney biopsy specimen.      Gross and microscopic examination demonstrated that the specimen was obtained from the kidney: Yes     Microscopic examination demonstrated that the specimen contained adequate numbers of glomeruli for diagnostic evaluation: Yes     I directed  to obtain a second specimen: Yes     Microscopic examination demonstrated that the second specimen contained adequate numbers of glomeruli for diagnostic evaluation: Yes     Seen with Dr Turner, Nephrology attending     Fabiano Nolan MD  Pediatric Nephrology fellow        " Called and spoke with patient about the following:     INFORMATION SENT TO: [email or web address]    Please arrive to [facility] at 7:15am on 01/09/25 for your scheduled procedure.  Address: [address] [place] [address] [place] [address] ([address] Building on left, 1st Floor Lobby)    !!!NO FOOD after midnight! You may have clear liquids up to 3 hrs before your arrival to the Hospital!!!  Clear liquids include Gatorade, water, soda, black coffee or tea (no milk or creamer), and clear juices.  Clear liquids do NOT include anything with pulp or food particles (Chicken broth, ice cream, yogurt, Jello, etc.)    Thank you,  -[facility] Pre [facility]  Mon-Fri 7:30 am - 4 pm [phone]

## 2025-01-31 ENCOUNTER — LAB VISIT (OUTPATIENT)
Dept: LAB | Facility: HOSPITAL | Age: 60
End: 2025-01-31
Attending: UROLOGY
Payer: COMMERCIAL

## 2025-01-31 ENCOUNTER — OFFICE VISIT (OUTPATIENT)
Dept: UROLOGY | Facility: CLINIC | Age: 60
End: 2025-01-31
Payer: COMMERCIAL

## 2025-01-31 VITALS
SYSTOLIC BLOOD PRESSURE: 124 MMHG | HEART RATE: 84 BPM | HEIGHT: 67 IN | RESPIRATION RATE: 18 BRPM | WEIGHT: 232.56 LBS | DIASTOLIC BLOOD PRESSURE: 82 MMHG | BODY MASS INDEX: 36.5 KG/M2 | TEMPERATURE: 98 F

## 2025-01-31 DIAGNOSIS — E34.9 TESTOSTERONE DEFICIENCY: ICD-10-CM

## 2025-01-31 DIAGNOSIS — N40.1 BPH WITH OBSTRUCTION/LOWER URINARY TRACT SYMPTOMS: Primary | ICD-10-CM

## 2025-01-31 DIAGNOSIS — N13.8 BPH WITH OBSTRUCTION/LOWER URINARY TRACT SYMPTOMS: Primary | ICD-10-CM

## 2025-01-31 LAB — POC RESIDUAL URINE VOLUME: 62 ML (ref 0–100)

## 2025-01-31 PROCEDURE — 51798 US URINE CAPACITY MEASURE: CPT | Mod: S$GLB,,, | Performed by: UROLOGY

## 2025-01-31 PROCEDURE — 1160F RVW MEDS BY RX/DR IN RCRD: CPT | Mod: CPTII,S$GLB,, | Performed by: UROLOGY

## 2025-01-31 PROCEDURE — 82670 ASSAY OF TOTAL ESTRADIOL: CPT | Performed by: UROLOGY

## 2025-01-31 PROCEDURE — 1159F MED LIST DOCD IN RCRD: CPT | Mod: CPTII,S$GLB,, | Performed by: UROLOGY

## 2025-01-31 PROCEDURE — 3079F DIAST BP 80-89 MM HG: CPT | Mod: CPTII,S$GLB,, | Performed by: UROLOGY

## 2025-01-31 PROCEDURE — 84403 ASSAY OF TOTAL TESTOSTERONE: CPT | Performed by: UROLOGY

## 2025-01-31 PROCEDURE — 4010F ACE/ARB THERAPY RXD/TAKEN: CPT | Mod: CPTII,S$GLB,, | Performed by: UROLOGY

## 2025-01-31 PROCEDURE — 3074F SYST BP LT 130 MM HG: CPT | Mod: CPTII,S$GLB,, | Performed by: UROLOGY

## 2025-01-31 PROCEDURE — 3008F BODY MASS INDEX DOCD: CPT | Mod: CPTII,S$GLB,, | Performed by: UROLOGY

## 2025-01-31 PROCEDURE — 99999 PR PBB SHADOW E&M-EST. PATIENT-LVL IV: CPT | Mod: PBBFAC,,, | Performed by: UROLOGY

## 2025-01-31 PROCEDURE — 36415 COLL VENOUS BLD VENIPUNCTURE: CPT | Mod: PN | Performed by: UROLOGY

## 2025-01-31 PROCEDURE — 99213 OFFICE O/P EST LOW 20 MIN: CPT | Mod: 24,S$GLB,, | Performed by: UROLOGY

## 2025-01-31 RX ORDER — NEEDLES, DISPOSABLE 23GX1 1/2"
NEEDLE, DISPOSABLE MISCELLANEOUS
Qty: 12 EACH | Refills: 4 | Status: SHIPPED | OUTPATIENT
Start: 2025-01-31

## 2025-01-31 RX ORDER — TESTOSTERONE CYPIONATE 200 MG/ML
INJECTION, SOLUTION INTRAMUSCULAR
Qty: 4 ML | Refills: 5 | OUTPATIENT
Start: 2025-01-31

## 2025-01-31 RX ORDER — SYRINGE, DISPOSABLE, 3 ML
SYRINGE, EMPTY DISPOSABLE MISCELLANEOUS
Qty: 12 EACH | Refills: 4 | Status: SHIPPED | OUTPATIENT
Start: 2025-01-31

## 2025-01-31 RX ORDER — NEEDLES, DISPOSABLE 25GX5/8"
NEEDLE, DISPOSABLE MISCELLANEOUS
Qty: 12 EACH | Refills: 4 | Status: SHIPPED | OUTPATIENT
Start: 2025-01-31

## 2025-01-31 RX ORDER — TESTOSTERONE CYPIONATE 200 MG/ML
200 INJECTION, SOLUTION INTRAMUSCULAR
Qty: 4 ML | Refills: 5 | Status: SHIPPED | OUTPATIENT
Start: 2025-01-31

## 2025-01-31 NOTE — PROGRESS NOTES
CC: low T, BPH    History of Present Illness:     1/31/25-Pt 3 weeks post-op from TURP. He has penile pain, which improves with uribel. No gross hematuria. He has urgency pain with an urge to urinate. No incontinence. Still on myrbetriq and cialis. Still on TRT. Due tomorrow for injection. Helps with energy.   7/30/24-Pt on daily cialis, myrbetriq and testosterone cyp 200mg Q7 days. States that he had missed an injection prior to his lab work. He did another sleep study yesterday due to chronic fatigue. Getting a new machine. Still having UUI 2-3 days a week.   11/7/23-Pt did blood work 4 days after injection. Taking 400mg IM Q14 days. Energy is okay. Urgency is improved with myrbetriq. Taking flomax and daily cialis. No gross hematuria. No difficulty emptying. Lost weight with diet and feeling better.   5/5/23-Myrbetriq does help, but he still has some UUI. Stream is good. Energy is not great, but sometimes missing injections and then doubling up. No gross hematuria.   11/4/22- Recent testosterone level was good at 732 1 day after injection. Feels like he is having pretty good energy. Patient has been on testosterone cypionate 200mg IM Q7 days. Also on daily cialis and flomax. He continues to have UUI and changes clothing when it happens. States that stream is great.     5/3/22- Using xyosted 75mg Q7 days. Took injection 1 day prior to blood work. Can feel his level drop off toward the end. Taking trospium 20mg once daily and seems to be working well. No difficulty emptying. UUI is much improved. EtOH makes UUI worse. Continues to take flomax and cialis.  Erections are okay.   11/23/21- here for testopel. Prostate measures at 44g on ultrasound  11/19/21- here for cysto.   11/5/21-CMP and CBC done at [facility] and fairly normal. On flomax, daily cialis, sanctura and T 400mg IM Q 14 days. It's been 3 weeks since his last injection. Reports that he is gaining fat and not sure why. Eating healthy. Urgency has improved but  notices his pants are a little wet. Wearing C-pap.   8/5/21- Pt is a 55yo male here for follow up.  He has testosterone deficiency and has been managed on testopel for years now, but recently switched back to IM testosterone cypionate injections due to insurance. He is also managed on flomax and daily cialis. Wears his C-pap. He reports that he is having insensate incontinence. Also has urgency, which is severe. Also having nocturnal enuresis. Good stream. Feels like he empties.   1 cup of coffee daily. Tried oxybutynin in the past, but can't remember why he stopped.     H/H a little elevated at 17.3/54.7. Has since donated blood.     Review of Systems   Constitutional:  Negative for activity change, appetite change, chills and fever.   Respiratory:  Negative for chest tightness and shortness of breath.    Cardiovascular:  Negative for chest pain.   Gastrointestinal:  Negative for abdominal pain.   Genitourinary:  Positive for nocturia. Negative for enuresis.   Psychiatric/Behavioral:  Negative for confusion.    All other systems reviewed and are negative.      Current Outpatient Medications   Medication Sig Dispense Refill    citalopram (CELEXA) 20 MG tablet Take 20 mg by mouth once daily.      fenofibrate 160 MG Tab Take 160 mg by mouth.      HYDROcodone-acetaminophen (NORCO) 5-325 mg per tablet Take 1 tablet by mouth every 6 (six) hours as needed for Pain. 10 tablet 0    levothyroxine (SYNTHROID) 25 MCG tablet Take 25 mcg by mouth.      lisinopriL 10 MG tablet lisinopril 10 mg tabs      mirabegron (MYRBETRIQ) 50 mg Tb24 Take 1 tablet (50 mg total) by mouth once daily. 30 tablet 11    tadalafiL (CIALIS) 5 MG tablet Take 1 tablet (5 mg total) by mouth once daily. 30 tablet 11    tamsulosin (FLOMAX) 0.4 mg Cap Take 1 capsule (0.4 mg total) by mouth every evening. 30 capsule 11    methen-m.blue-s.phos-phsal-hyo (URIBEL) 118-10-40.8-36 mg Cap Take 1 capsule by mouth 4 (four) times daily as needed (bladder pain). 30  "capsule 3    MOUNJARO 5 mg/0.5 mL PnIj INJECT 5 mg into skin EVERY 7 DAYS (Patient not taking: Reported on 1/31/2025)      needle, disp, 18 G (HYPODERMIC NEEDLES) 18 gauge x 1 1/2" Ndle Use as directed 12 each 4    needle, disp, 23 gauge (EASY TOUCH HYPODERMIC NEEDLE) 23 gauge x 1 1/2" Ndle Use as directed 12 each 4    pantoprazole (PROTONIX) 40 MG tablet Take 40 mg by mouth.      syringe, disposable, 3 mL Syrg Use as directed 12 each 4    testosterone cypionate (DEPOTESTOTERONE CYPIONATE) 200 mg/mL injection Inject 1 mL (200 mg total) into the muscle every 7 days. 4 mL 5     No current facility-administered medications for this visit.       Review of patient's allergies indicates:  No Known Allergies    Past medical, family, and social history reviewed as documented in chart with pertinent positive medical, family, and social history detailed in HPI.    Physical Exam  Vitals:    01/31/25 0905   BP: 124/82   Pulse: 84   Resp: 18   Temp: 98.2 °F (36.8 °C)         General: Well-developed, well-nourished, in no acute distress  HEENT: Normocephalic, atraumatic, extraocular eye movements in tact  Neck: supple, trachea midline, no cervical or supraclavicular adenopathy  Rectal: 11/7/23- 40gram prostate without nodules or tenderness. No gross blood.   Extremities: Moves all extremities equally, atraumatic, no clubbing, cyanosis, edema  Skin: warm and dry, no lesions  Psych: normal affect  Neuro: Alert and oriented x 3. Cranial nerves II-XII grossly intact    PVR: 62cc      PSA  4/26/22: 0.72  11/1/22: 0.58  5/1/23: 0.39  11/2/23: 0.67  5/6/24: 0.73    Lab Results   Component Value Date    ALT 21 12/27/2024    AST 18 12/27/2024    ALKPHOS 34 (L) 12/27/2024    BILITOT 0.3 12/27/2024     BMP  Lab Results   Component Value Date     01/10/2025    K 4.5 01/10/2025     01/10/2025    CO2 26 01/10/2025    BUN 13 01/10/2025    CREATININE 1.1 01/10/2025    CALCIUM 8.9 01/10/2025    ANIONGAP 8 01/10/2025    EGFRNORACEVR " ">60 01/10/2025     Lab Results   Component Value Date    WBC 8.90 01/10/2025    HGB 12.0 (L) 01/10/2025    HCT 43.3 01/10/2025    MCV 68 (L) 01/10/2025     01/10/2025       Testosterone:   1/31/25: 597    Assessment:   1. BPH with obstruction/lower urinary tract symptoms  POCT Bladder Scan      2. Testosterone deficiency  testosterone cypionate (DEPOTESTOTERONE CYPIONATE) 200 mg/mL injection    Testosterone    Estradiol                  Plan:  BPH with obstruction/lower urinary tract symptoms  -     POCT Bladder Scan    Testosterone deficiency  -     testosterone cypionate (DEPOTESTOTERONE CYPIONATE) 200 mg/mL injection; Inject 1 mL (200 mg total) into the muscle every 7 days.  Dispense: 4 mL; Refill: 5  -     Testosterone; Future; Expected date: 01/31/2025  -     Estradiol; Future; Expected date: 01/31/2025    Other orders  -     feliz-m.blue-s.phos-phsal-hyo (URIBEL) 118-10-40.8-36 mg Cap; Take 1 capsule by mouth 4 (four) times daily as needed (bladder pain).  Dispense: 30 capsule; Refill: 3  -     needle, disp, 23 gauge (EASY TOUCH HYPODERMIC NEEDLE) 23 gauge x 1 1/2" Ndle; Use as directed  Dispense: 12 each; Refill: 4  -     needle, disp, 18 G (HYPODERMIC NEEDLES) 18 gauge x 1 1/2" Ndle; Use as directed  Dispense: 12 each; Refill: 4  -     syringe, disposable, 3 mL Syrg; Use as directed  Dispense: 12 each; Refill: 4      Follow up in about 3 months (around 4/30/2025).                      "

## 2025-02-01 LAB
ESTRADIOL SERPL-MCNC: 28 PG/ML (ref 11–44)
TESTOST SERPL-MCNC: 597 NG/DL (ref 304–1227)

## 2025-02-26 ENCOUNTER — PATIENT MESSAGE (OUTPATIENT)
Dept: SURGERY | Facility: HOSPITAL | Age: 60
End: 2025-02-26
Payer: COMMERCIAL

## 2025-02-26 ENCOUNTER — TELEPHONE (OUTPATIENT)
Dept: UROLOGY | Facility: CLINIC | Age: 60
End: 2025-02-26
Payer: COMMERCIAL

## 2025-02-26 RX ORDER — SOLIFENACIN SUCCINATE 5 MG/1
5 TABLET, FILM COATED ORAL DAILY
Qty: 30 TABLET | Refills: 3 | Status: SHIPPED | OUTPATIENT
Start: 2025-02-26 | End: 2026-02-26

## 2025-02-26 NOTE — TELEPHONE ENCOUNTER
----- Message from Troy sent at 2/26/2025 10:23 AM CST -----  Contact: Tre  Type:  Patient Returning CallWho Called:Tre Who Left Message for Patient:Nurse (per patient Does the patient know what this is regarding?:Would the patient rather a call back or a response via [facility]? Call Back Best Call Back Number:[phone] Information:

## 2025-02-26 NOTE — TELEPHONE ENCOUNTER
Spoke with patient who was able to provide acceptable patient identifiers prior to start of conversation. Patient complain of urinary frequency constantly throughout the day/night. Patient states he's urinating like every 15 minutes. There is no pain, burning, or discomfort qith urination. Patient would like to know if something could be sent in for urinary frequency. Will notify Dr. Drew reference to patient concern.

## 2025-02-26 NOTE — TELEPHONE ENCOUNTER
----- Message from Med Assistant Reagan sent at 2/26/2025  9:28 AM CST -----  Contact: self  Patient complain about urinary frequency since surgery in January. Could you send in medication to pharmacy attached? Or do you want patient to conduct urine specimen?  ----- Message -----  From: Marv Escobedo  Sent: 2/26/2025   8:55 AM CST  To: Rajendra OLIVERA Staff    .Type:  Needs Medical AdviceWho Called: .Tre CHIANG StellySymptoms (please be specific): frequent urination How long has patient had these symptoms: 1 weekPharmacy name and phone #:  .[facility] [employer] [facility] [address] [address] [address]Slbie: [phone] Fax: [phone] the patient rather a call back or a response via [facility]? Call [facility] Call Back Number: .[phone] Information: Pt states he had surgery in january and is having issues with frequent urination and would like a call back to fix this issue.

## 2025-03-14 ENCOUNTER — PATIENT MESSAGE (OUTPATIENT)
Dept: SURGERY | Facility: HOSPITAL | Age: 60
End: 2025-03-14
Payer: COMMERCIAL

## 2025-05-07 ENCOUNTER — OFFICE VISIT (OUTPATIENT)
Dept: UROLOGY | Facility: CLINIC | Age: 60
End: 2025-05-07
Payer: COMMERCIAL

## 2025-05-07 VITALS
SYSTOLIC BLOOD PRESSURE: 149 MMHG | RESPIRATION RATE: 18 BRPM | HEART RATE: 75 BPM | DIASTOLIC BLOOD PRESSURE: 89 MMHG | WEIGHT: 241.38 LBS | HEIGHT: 67 IN | BODY MASS INDEX: 37.89 KG/M2 | TEMPERATURE: 98 F

## 2025-05-07 DIAGNOSIS — N40.1 BPH WITH OBSTRUCTION/LOWER URINARY TRACT SYMPTOMS: Primary | ICD-10-CM

## 2025-05-07 DIAGNOSIS — E34.9 TESTOSTERONE DEFICIENCY: ICD-10-CM

## 2025-05-07 DIAGNOSIS — N32.81 OAB (OVERACTIVE BLADDER): ICD-10-CM

## 2025-05-07 DIAGNOSIS — N13.8 BPH WITH OBSTRUCTION/LOWER URINARY TRACT SYMPTOMS: Primary | ICD-10-CM

## 2025-05-07 LAB — POC RESIDUAL URINE VOLUME: 70 ML (ref 0–100)

## 2025-05-07 PROCEDURE — 51798 US URINE CAPACITY MEASURE: CPT | Mod: S$GLB,,, | Performed by: UROLOGY

## 2025-05-07 PROCEDURE — 1159F MED LIST DOCD IN RCRD: CPT | Mod: CPTII,S$GLB,, | Performed by: UROLOGY

## 2025-05-07 PROCEDURE — 3008F BODY MASS INDEX DOCD: CPT | Mod: CPTII,S$GLB,, | Performed by: UROLOGY

## 2025-05-07 PROCEDURE — 99999 PR PBB SHADOW E&M-EST. PATIENT-LVL IV: CPT | Mod: PBBFAC,,, | Performed by: UROLOGY

## 2025-05-07 PROCEDURE — 99214 OFFICE O/P EST MOD 30 MIN: CPT | Mod: S$GLB,,, | Performed by: UROLOGY

## 2025-05-07 PROCEDURE — 3077F SYST BP >= 140 MM HG: CPT | Mod: CPTII,S$GLB,, | Performed by: UROLOGY

## 2025-05-07 PROCEDURE — 3079F DIAST BP 80-89 MM HG: CPT | Mod: CPTII,S$GLB,, | Performed by: UROLOGY

## 2025-05-07 PROCEDURE — 4010F ACE/ARB THERAPY RXD/TAKEN: CPT | Mod: CPTII,S$GLB,, | Performed by: UROLOGY

## 2025-05-07 RX ORDER — TADALAFIL 5 MG/1
5 TABLET ORAL DAILY
Qty: 30 TABLET | Refills: 11 | Status: SHIPPED | OUTPATIENT
Start: 2025-05-07

## 2025-05-07 RX ORDER — NEEDLES, DISPOSABLE 25GX5/8"
NEEDLE, DISPOSABLE MISCELLANEOUS
Qty: 12 EACH | Refills: 4 | Status: SHIPPED | OUTPATIENT
Start: 2025-05-07

## 2025-05-07 RX ORDER — TESTOSTERONE CYPIONATE 200 MG/ML
200 INJECTION, SOLUTION INTRAMUSCULAR
Qty: 4 ML | Refills: 5 | Status: SHIPPED | OUTPATIENT
Start: 2025-05-07

## 2025-05-07 RX ORDER — NEEDLES, DISPOSABLE 23GX1 1/2"
NEEDLE, DISPOSABLE MISCELLANEOUS
Qty: 12 EACH | Refills: 4 | Status: SHIPPED | OUTPATIENT
Start: 2025-05-07

## 2025-05-07 RX ORDER — SYRINGE, DISPOSABLE, 3 ML
SYRINGE, EMPTY DISPOSABLE MISCELLANEOUS
Qty: 12 EACH | Refills: 4 | Status: SHIPPED | OUTPATIENT
Start: 2025-05-07

## 2025-05-07 RX ORDER — SOLIFENACIN SUCCINATE 5 MG/1
5 TABLET, FILM COATED ORAL DAILY
Qty: 30 TABLET | Refills: 11 | Status: SHIPPED | OUTPATIENT
Start: 2025-05-07 | End: 2026-05-07

## 2025-05-07 NOTE — PROGRESS NOTES
CC: low T, BPH    History of Present Illness:     5/7/25-Pt complains of post-void dribble pretty consistently. Vesicare was keeping him completely dry but he has run out. He was having urgency, but forces himself to hold it and the urgency seems to resolve. Stream is very good. 1 cup of coffee. The rest of the day drinks only water (at least 4 bottles). States that his urine is malodorous. No dysuria or gross hematuria. Currently, the only medication he is taking from me is cialis. Energy is good on testosterone.   1/31/25-Pt 3 weeks post-op from TURP. He has penile pain, which improves with uribel. No gross hematuria. He has urgency pain with an urge to urinate. No incontinence. Still on myrbetriq and cialis. Still on TRT. Due tomorrow for injection. Helps with energy.   7/30/24-Pt on daily cialis, myrbetriq and testosterone cyp 200mg Q7 days. States that he had missed an injection prior to his lab work. He did another sleep study yesterday due to chronic fatigue. Getting a new machine. Still having UUI 2-3 days a week.   11/7/23-Pt did blood work 4 days after injection. Taking 400mg IM Q14 days. Energy is okay. Urgency is improved with myrbetriq. Taking flomax and daily cialis. No gross hematuria. No difficulty emptying. Lost weight with diet and feeling better.   5/5/23-Myrbetriq does help, but he still has some UUI. Stream is good. Energy is not great, but sometimes missing injections and then doubling up. No gross hematuria.   11/4/22- Recent testosterone level was good at 732 1 day after injection. Feels like he is having pretty good energy. Patient has been on testosterone cypionate 200mg IM Q7 days. Also on daily cialis and flomax. He continues to have UUI and changes clothing when it happens. States that stream is great.     5/3/22- Using xyosted 75mg Q7 days. Took injection 1 day prior to blood work. Can feel his level drop off toward the end. Taking trospium 20mg once daily and seems to be working  "well. No difficulty emptying. UUI is much improved. EtOH makes UUI worse. Continues to take flomax and cialis.  Erections are okay.   11/23/21- here for testopel. Prostate measures at 44g on ultrasound  11/19/21- here for cysto.   11/5/21-CMP and CBC done at [facility] and fairly normal. On flomax, daily cialis, sanctura and T 400mg IM Q 14 days. It's been 3 weeks since his last injection. Reports that he is gaining fat and not sure why. Eating healthy. Urgency has improved but notices his pants are a little wet. Wearing C-pap.   8/5/21- Pt is a 57yo male here for follow up.  He has testosterone deficiency and has been managed on testopel for years now, but recently switched back to IM testosterone cypionate injections due to insurance. He is also managed on flomax and daily cialis. Wears his C-pap. He reports that he is having insensate incontinence. Also has urgency, which is severe. Also having nocturnal enuresis. Good stream. Feels like he empties.   1 cup of coffee daily. Tried oxybutynin in the past, but can't remember why he stopped.     H/H a little elevated at 17.3/54.7. Has since donated blood.     Review of Systems   Constitutional:  Negative for activity change, appetite change, chills and fever.   Respiratory:  Negative for chest tightness and shortness of breath.    Cardiovascular:  Negative for chest pain.   Gastrointestinal:  Negative for abdominal pain.   Genitourinary:  Positive for nocturia. Negative for enuresis.   Psychiatric/Behavioral:  Negative for confusion.    All other systems reviewed and are negative.      Current Outpatient Medications   Medication Sig Dispense Refill    citalopram (CELEXA) 20 MG tablet Take 20 mg by mouth once daily.      fenofibrate 160 MG Tab Take 160 mg by mouth.      levothyroxine (SYNTHROID) 25 MCG tablet Take 25 mcg by mouth.      lisinopriL 10 MG tablet lisinopril 10 mg tabs      MOUNJARO 5 mg/0.5 mL Alcides       needle, disp, 18 G (HYPODERMIC NEEDLES) 18 gauge x 1 1/2" " "Ndle Use as directed 12 each 4    needle, disp, 23 gauge (EASY TOUCH HYPODERMIC NEEDLE) 23 gauge x 1 1/2" Ndle Use as directed 12 each 4    pantoprazole (PROTONIX) 40 MG tablet Take 40 mg by mouth.      solifenacin (VESICARE) 5 MG tablet Take 1 tablet (5 mg total) by mouth once daily. 30 tablet 11    syringe, disposable, 3 mL Syrg Use as directed 12 each 4    tadalafiL (CIALIS) 5 MG tablet Take 1 tablet (5 mg total) by mouth once daily. 30 tablet 11    testosterone cypionate (DEPOTESTOTERONE CYPIONATE) 200 mg/mL injection Inject 1 mL (200 mg total) into the muscle every 7 days. 4 mL 5     No current facility-administered medications for this visit.       Review of patient's allergies indicates:  No Known Allergies    Past medical, family, and social history reviewed as documented in chart with pertinent positive medical, family, and social history detailed in HPI.    Physical Exam  Vitals:    05/07/25 1004   BP: (!) 149/89   Pulse: 75   Resp: 18   Temp: 98.1 °F (36.7 °C)         General: Well-developed, well-nourished, in no acute distress  HEENT: Normocephalic, atraumatic, extraocular eye movements in tact  Neck: supple, trachea midline, no cervical or supraclavicular adenopathy  Rectal: 11/7/23- 40gram prostate without nodules or tenderness. No gross blood.   Extremities: Moves all extremities equally, atraumatic, no clubbing, cyanosis, edema  Skin: warm and dry, no lesions  Psych: normal affect  Neuro: Alert and oriented x 3. Cranial nerves II-XII grossly intact      UA: negative for blood, LE, nit      PVR: 70cc      PSA  4/26/22: 0.72  11/1/22: 0.58  5/1/23: 0.39  11/2/23: 0.67  5/6/24: 0.73    Lab Results   Component Value Date    ALT 21 12/27/2024    AST 18 12/27/2024    ALKPHOS 34 (L) 12/27/2024    BILITOT 0.3 12/27/2024     BMP  Lab Results   Component Value Date     01/10/2025    K 4.5 01/10/2025     01/10/2025    CO2 26 01/10/2025    BUN 13 01/10/2025    CREATININE 1.1 01/10/2025    CALCIUM " "8.9 01/10/2025    ANIONGAP 8 01/10/2025    EGFRNORACEVR >60 01/10/2025     Lab Results   Component Value Date    WBC 8.90 01/10/2025    HGB 12.0 (L) 01/10/2025    HCT 43.3 01/10/2025    MCV 68 (L) 01/10/2025     01/10/2025       Testosterone:   1/31/25: 597    Assessment:   1. BPH with obstruction/lower urinary tract symptoms  POCT Bladder Scan    Prostate Specific Antigen, Diagnostic      2. Testosterone deficiency  testosterone cypionate (DEPOTESTOTERONE CYPIONATE) 200 mg/mL injection    CBC Without Differential    Comprehensive Metabolic Panel    Estradiol    Testosterone,Total      3. OAB (overactive bladder)                    Plan:  BPH with obstruction/lower urinary tract symptoms  -     POCT Bladder Scan  -     Prostate Specific Antigen, Diagnostic; Future; Expected date: 10/07/2025    Testosterone deficiency  -     testosterone cypionate (DEPOTESTOTERONE CYPIONATE) 200 mg/mL injection; Inject 1 mL (200 mg total) into the muscle every 7 days.  Dispense: 4 mL; Refill: 5  -     CBC Without Differential; Future; Expected date: 10/07/2025  -     Comprehensive Metabolic Panel; Future; Expected date: 10/07/2025  -     Estradiol; Future; Expected date: 10/07/2025  -     Testosterone,Total; Future; Expected date: 10/07/2025    OAB (overactive bladder)    Other orders  -     solifenacin (VESICARE) 5 MG tablet; Take 1 tablet (5 mg total) by mouth once daily.  Dispense: 30 tablet; Refill: 11  -     needle, disp, 23 gauge (EASY TOUCH HYPODERMIC NEEDLE) 23 gauge x 1 1/2" Ndle; Use as directed  Dispense: 12 each; Refill: 4  -     needle, disp, 18 G (HYPODERMIC NEEDLES) 18 gauge x 1 1/2" Ndle; Use as directed  Dispense: 12 each; Refill: 4  -     syringe, disposable, 3 mL Syrg; Use as directed  Dispense: 12 each; Refill: 4  -     tadalafiL (CIALIS) 5 MG tablet; Take 1 tablet (5 mg total) by mouth once daily.  Dispense: 30 tablet; Refill: 11      Follow up in about 5 months (around 10/7/2025) for labs before " visit.

## 2025-05-16 NOTE — TELEPHONE ENCOUNTER
----- Message from [facility] sent at 5/16/2025 12:44 PM CDT -----  Contact: self  Type:  Needs Medical AdviceWho Called: Tre Cadena name and phone #:  [facility], [facility] [address] [facility] [id number] [address] [address]Cifjb: [phone] Fax: [phone] the patient rather a call back or a response via [facility]? Call [facility] Call Back Number: [phone] Information: the patient was calling to see if he can get the prescription for his overactive bladder called in. The pharmacy states that they haven't seen anything. the patient couldn't remember the name.

## 2025-05-19 RX ORDER — SOLIFENACIN SUCCINATE 5 MG/1
5 TABLET, FILM COATED ORAL DAILY
Qty: 30 TABLET | Refills: 11 | Status: SHIPPED | OUTPATIENT
Start: 2025-05-19 | End: 2026-05-19

## (undated) DEVICE — DRAPE T CYSTOSCOPY STERILE

## (undated) DEVICE — SYR 50ML CATH TIP

## (undated) DEVICE — CANISTER SUCTION JUMBO 12L

## (undated) DEVICE — GLOVE SIGNATURE ESSNTL LTX 6

## (undated) DEVICE — BAG DRAIN ANTI REFLUX 4000ML

## (undated) DEVICE — SOL IRRI STRL WATER 1000ML

## (undated) DEVICE — TUBING ARTHRO IRR 4-LEAD

## (undated) DEVICE — BOWL STERILE LARGE 32OZ

## (undated) DEVICE — CONTAINER SPECIMEN OR STER 4OZ

## (undated) DEVICE — COVER LIGHT HANDLE 80/CA

## (undated) DEVICE — CATH URET FOLEY 3W 18FR 30ML

## (undated) DEVICE — ELECTRODE LOOP CUTTING BIPOLAR

## (undated) DEVICE — SPONGE COTTON TRAY 4X4IN

## (undated) DEVICE — TOWEL OR DISP STRL BLUE 4/PK

## (undated) DEVICE — GOWN POLY REINF X-LONG XL

## (undated) DEVICE — UROVIEW 2600/2800

## (undated) DEVICE — TRAY SKIN SCRUB WET 4 COMPART

## (undated) DEVICE — SOL NACL IRR 3000ML